# Patient Record
Sex: FEMALE | Race: WHITE | URBAN - METROPOLITAN AREA
[De-identification: names, ages, dates, MRNs, and addresses within clinical notes are randomized per-mention and may not be internally consistent; named-entity substitution may affect disease eponyms.]

---

## 2017-07-07 ENCOUNTER — EMERGENCY (EMERGENCY)
Facility: HOSPITAL | Age: 47
LOS: 0 days | Discharge: HOME | End: 2017-07-07
Attending: EMERGENCY MEDICINE | Admitting: EMERGENCY MEDICINE
Payer: SUBSIDIZED

## 2017-07-07 VITALS — HEIGHT: 64 IN | WEIGHT: 130.07 LBS

## 2017-07-07 VITALS
TEMPERATURE: 98 F | DIASTOLIC BLOOD PRESSURE: 97 MMHG | HEART RATE: 80 BPM | OXYGEN SATURATION: 100 % | RESPIRATION RATE: 18 BRPM | SYSTOLIC BLOOD PRESSURE: 143 MMHG

## 2017-07-07 DIAGNOSIS — F31.9 BIPOLAR DISORDER, UNSPECIFIED: ICD-10-CM

## 2017-07-07 DIAGNOSIS — Z98.890 OTHER SPECIFIED POSTPROCEDURAL STATES: ICD-10-CM

## 2017-07-07 DIAGNOSIS — G56.00 CARPAL TUNNEL SYNDROME, UNSPECIFIED UPPER LIMB: ICD-10-CM

## 2017-07-07 DIAGNOSIS — R56.9 UNSPECIFIED CONVULSIONS: ICD-10-CM

## 2017-07-07 DIAGNOSIS — Z90.711 ACQUIRED ABSENCE OF UTERUS WITH REMAINING CERVICAL STUMP: ICD-10-CM

## 2017-07-07 DIAGNOSIS — J40 BRONCHITIS, NOT SPECIFIED AS ACUTE OR CHRONIC: ICD-10-CM

## 2017-07-07 DIAGNOSIS — R07.9 CHEST PAIN, UNSPECIFIED: ICD-10-CM

## 2017-07-07 DIAGNOSIS — N80.0 ENDOMETRIOSIS OF UTERUS: ICD-10-CM

## 2017-07-07 DIAGNOSIS — D50.9 IRON DEFICIENCY ANEMIA, UNSPECIFIED: ICD-10-CM

## 2017-07-07 PROCEDURE — 71020: CPT | Mod: 26

## 2017-07-07 PROCEDURE — 93010 ELECTROCARDIOGRAM REPORT: CPT

## 2017-07-07 PROCEDURE — 99284 EMERGENCY DEPT VISIT MOD MDM: CPT

## 2017-07-07 RX ORDER — ALBUTEROL 90 UG/1
2 AEROSOL, METERED ORAL
Qty: 1 | Refills: 0 | OUTPATIENT
Start: 2017-07-07 | End: 2017-08-06

## 2017-07-07 RX ORDER — AZITHROMYCIN 500 MG/1
1 TABLET, FILM COATED ORAL
Qty: 6 | Refills: 0 | OUTPATIENT
Start: 2017-07-07 | End: 2017-07-13

## 2017-07-07 RX ORDER — ALBUTEROL 90 UG/1
2.5 AEROSOL, METERED ORAL
Qty: 0 | Refills: 0 | Status: COMPLETED | OUTPATIENT
Start: 2017-07-07 | End: 2017-07-07

## 2017-07-07 RX ORDER — AZITHROMYCIN 500 MG/1
250 TABLET, FILM COATED ORAL
Qty: 1250 | Refills: 0 | OUTPATIENT
Start: 2017-07-07 | End: 2017-07-12

## 2017-07-07 RX ADMIN — ALBUTEROL 2.5 MILLIGRAM(S): 90 AEROSOL, METERED ORAL at 15:36

## 2017-07-07 RX ADMIN — ALBUTEROL 2.5 MILLIGRAM(S): 90 AEROSOL, METERED ORAL at 15:00

## 2017-07-07 RX ADMIN — ALBUTEROL 2.5 MILLIGRAM(S): 90 AEROSOL, METERED ORAL at 14:40

## 2017-07-07 NOTE — ED STATDOCS - PMH
Carpal Tunnel Syndrome  left  Iron Deficiency Anemia    Manic Depression    Seizure 1 episode  1996    Uterine Endometriosis

## 2017-07-07 NOTE — ED STATDOCS - OBJECTIVE STATEMENT
46 y/o F presents to the ED c/o intermittent stabbing chest pain when laying down, HA, and cough with brown sputum. Pt has had worsening sx for 3 weeks with cough x 1 week. She quit smoking in April.

## 2017-07-07 NOTE — ED STATDOCS - PROGRESS NOTE DETAILS
pt with1 month  hx of productive cough and pleuritic CP. No associated fevers, sick contact or recent tracel  vss, afebrile  gen:NAD, CVS: S1,S2, RRR, Pulm: b/l rhonic in all lung fields  47 with probable URI- f/u cxr, albuterol nebs x3, reassess

## 2017-07-07 NOTE — ED STATDOCS - MEDICAL DECISION MAKING DETAILS
pt with three weeks of sinus congestion as well as cough that has become productive.  Concern for a sinus infection that is now PNA.  Will get CxR.  based on duration of symptoms will treat with ABx. Nebs for symptomatic relief.

## 2017-07-07 NOTE — ED ADULT NURSE NOTE - OBJECTIVE STATEMENT
[facility] pt is awake alert oriented following commands and speaking coherently. pt presents to er complaining chest pain when lying down, brown sputum, and cough x 1 week. pt states she quit smoking in april. rhonchi auscultated to b/l lungs, diffuse. resp even and unlabored. no respiratory distress. denies nausea vomiting fever chills headache and urinary problems.

## 2017-07-19 ENCOUNTER — EMERGENCY (EMERGENCY)
Facility: HOSPITAL | Age: 47
LOS: 0 days | Discharge: HOME | End: 2017-07-19
Attending: EMERGENCY MEDICINE | Admitting: EMERGENCY MEDICINE
Payer: SELF-PAY

## 2017-07-19 VITALS — DIASTOLIC BLOOD PRESSURE: 103 MMHG | SYSTOLIC BLOOD PRESSURE: 140 MMHG

## 2017-07-19 VITALS — WEIGHT: 130.07 LBS

## 2017-07-19 DIAGNOSIS — Z86.69 PERSONAL HISTORY OF OTHER DISEASES OF THE NERVOUS SYSTEM AND SENSE ORGANS: ICD-10-CM

## 2017-07-19 DIAGNOSIS — N80.0 ENDOMETRIOSIS OF UTERUS: ICD-10-CM

## 2017-07-19 DIAGNOSIS — R05 COUGH: ICD-10-CM

## 2017-07-19 DIAGNOSIS — F31.9 BIPOLAR DISORDER, UNSPECIFIED: ICD-10-CM

## 2017-07-19 DIAGNOSIS — B34.9 VIRAL INFECTION, UNSPECIFIED: ICD-10-CM

## 2017-07-19 DIAGNOSIS — D50.9 IRON DEFICIENCY ANEMIA, UNSPECIFIED: ICD-10-CM

## 2017-07-19 DIAGNOSIS — Z90.711 ACQUIRED ABSENCE OF UTERUS WITH REMAINING CERVICAL STUMP: ICD-10-CM

## 2017-07-19 PROCEDURE — 71020: CPT | Mod: 26

## 2017-07-19 PROCEDURE — 99284 EMERGENCY DEPT VISIT MOD MDM: CPT

## 2017-07-19 RX ORDER — IPRATROPIUM/ALBUTEROL SULFATE 18-103MCG
3 AEROSOL WITH ADAPTER (GRAM) INHALATION ONCE
Qty: 0 | Refills: 0 | Status: COMPLETED | OUTPATIENT
Start: 2017-07-19 | End: 2017-07-19

## 2017-07-19 RX ADMIN — Medication 50 MILLIGRAM(S): at 16:20

## 2017-07-19 RX ADMIN — Medication 3 MILLILITER(S): at 16:21

## 2017-07-19 RX ADMIN — Medication 3 MILLILITER(S): at 15:55

## 2017-07-19 NOTE — ED STATDOCS - OBJECTIVE STATEMENT
46 y/o F with hx of depression presents to the ED c/o cough with yellow sputum and nasal congestion x 5 weeks. Pt states she was seen in ED on 7/7, dx with bronchitis, given nebs, z-pack, and inhaler with improvement, however sx returned. Currently pt has no other complaints and denies CP, HA, fever and chills.

## 2017-07-19 NOTE — ED STATDOCS - PROGRESS NOTE DETAILS
48 yo female with a PMH of depression and past smoker, quit in april presents with cough, intermittent prod cough of yellow/clear sputum presents with constant cough. States was given z apck and albuterol without relief. Denies sick contacts, f/c/sweating, n/v. Duoneb, prednisone, CXR. Likely viral. -Ernesto Adkins PA-C Reevaluated patient at bedside.  Patient feeling much improved.  Discussed the results of all diagnostic testing in ED and copies of all reports given.   An opportunity to ask questions was given.  Discussed the importance of prompt, close medical follow-up.  Patient will return with any changes, concerns or persistent / worsening symptoms.  Understanding of all instructions verbalized.  -Ernesto Adkins PA-C

## 2017-07-19 NOTE — ED ADULT TRIAGE NOTE - CHIEF COMPLAINT QUOTE
Pt presents to ED c/o headache, dizziness and cough. Pt reports she was recently treated for pna with abx but is feeling like it is coming back

## 2017-07-19 NOTE — ED ADULT NURSE REASSESSMENT NOTE - NS ED NURSE REASSESS COMMENT FT1
pt rec'd alert and oriented x3. vss. c/o cough and congestion ,. taken to xray. medicated as ordered. will continue to monitor

## 2017-07-19 NOTE — ED STATDOCS - ENMT, MLM
Postnasal drip.  Mouth with normal mucosa  Throat has no vesicles, no oropharyngeal exudates and uvula is midline.

## 2017-07-19 NOTE — ED STATDOCS - ATTENDING CONTRIBUTION TO CARE
I, Jeremias Glass, performed the initial face to face bedside interview with this patient regarding history of present illness, review of symptoms and relevant past medical, social and family history.  I completed an independent physical examination.  I was the initial provider who evaluated this patient. I have signed out the follow up of any pending tests (i.e. labs, radiological studies) to the ACP.  I have communicated the patient’s plan of care and disposition with the ACP.  The history, relevant review of systems, past medical and surgical history, medical decision making, and physical examination was documented by the scribe in my presence and I attest to the accuracy of the documentation.

## 2018-10-07 ENCOUNTER — INPATIENT (INPATIENT)
Facility: HOSPITAL | Age: 48
LOS: 5 days | Discharge: HOME | End: 2018-10-13
Attending: INTERNAL MEDICINE | Admitting: INTERNAL MEDICINE
Payer: SELF-PAY

## 2018-10-07 PROCEDURE — 99285 EMERGENCY DEPT VISIT HI MDM: CPT

## 2018-10-07 PROCEDURE — 74177 CT ABD & PELVIS W/CONTRAST: CPT | Mod: 26

## 2018-10-08 VITALS
SYSTOLIC BLOOD PRESSURE: 133 MMHG | TEMPERATURE: 99 F | DIASTOLIC BLOOD PRESSURE: 78 MMHG | HEART RATE: 66 BPM | RESPIRATION RATE: 18 BRPM | OXYGEN SATURATION: 97 %

## 2018-10-08 DIAGNOSIS — K57.92 DIVERTICULITIS OF INTESTINE, PART UNSPECIFIED, WITHOUT PERFORATION OR ABSCESS WITHOUT BLEEDING: ICD-10-CM

## 2018-10-08 RX ORDER — HYDROMORPHONE HYDROCHLORIDE 2 MG/ML
1 INJECTION INTRAMUSCULAR; INTRAVENOUS; SUBCUTANEOUS EVERY 4 HOURS
Qty: 0 | Refills: 0 | Status: DISCONTINUED | OUTPATIENT
Start: 2018-10-08 | End: 2018-10-13

## 2018-10-08 RX ORDER — METRONIDAZOLE 500 MG
500 TABLET ORAL EVERY 8 HOURS
Qty: 0 | Refills: 0 | Status: DISCONTINUED | OUTPATIENT
Start: 2018-10-08 | End: 2018-10-13

## 2018-10-08 RX ORDER — NICOTINE POLACRILEX 2 MG
1 GUM BUCCAL DAILY
Qty: 0 | Refills: 0 | Status: DISCONTINUED | OUTPATIENT
Start: 2018-10-08 | End: 2018-10-13

## 2018-10-08 RX ORDER — KETOROLAC TROMETHAMINE 30 MG/ML
30 SYRINGE (ML) INJECTION EVERY 6 HOURS
Qty: 0 | Refills: 0 | Status: DISCONTINUED | OUTPATIENT
Start: 2018-10-08 | End: 2018-10-12

## 2018-10-08 RX ORDER — ENOXAPARIN SODIUM 100 MG/ML
40 INJECTION SUBCUTANEOUS EVERY 24 HOURS
Qty: 0 | Refills: 0 | Status: DISCONTINUED | OUTPATIENT
Start: 2018-10-08 | End: 2018-10-13

## 2018-10-08 RX ORDER — DOCUSATE SODIUM 100 MG
100 CAPSULE ORAL
Qty: 0 | Refills: 0 | Status: DISCONTINUED | OUTPATIENT
Start: 2018-10-08 | End: 2018-10-10

## 2018-10-08 RX ORDER — DIPHENHYDRAMINE HCL 50 MG
25 CAPSULE ORAL ONCE
Qty: 0 | Refills: 0 | Status: COMPLETED | OUTPATIENT
Start: 2018-10-08 | End: 2018-10-08

## 2018-10-08 RX ORDER — CIPROFLOXACIN LACTATE 400MG/40ML
400 VIAL (ML) INTRAVENOUS EVERY 12 HOURS
Qty: 0 | Refills: 0 | Status: DISCONTINUED | OUTPATIENT
Start: 2018-10-08 | End: 2018-10-12

## 2018-10-08 RX ORDER — PANTOPRAZOLE SODIUM 20 MG/1
40 TABLET, DELAYED RELEASE ORAL DAILY
Qty: 0 | Refills: 0 | Status: DISCONTINUED | OUTPATIENT
Start: 2018-10-08 | End: 2018-10-09

## 2018-10-08 RX ORDER — CITALOPRAM 10 MG/1
40 TABLET, FILM COATED ORAL DAILY
Qty: 0 | Refills: 0 | Status: DISCONTINUED | OUTPATIENT
Start: 2018-10-08 | End: 2018-10-13

## 2018-10-08 RX ORDER — SODIUM CHLORIDE 9 MG/ML
1000 INJECTION, SOLUTION INTRAVENOUS
Qty: 0 | Refills: 0 | Status: DISCONTINUED | OUTPATIENT
Start: 2018-10-08 | End: 2018-10-12

## 2018-10-08 RX ORDER — SODIUM CHLORIDE 9 MG/ML
1000 INJECTION INTRAMUSCULAR; INTRAVENOUS; SUBCUTANEOUS
Qty: 0 | Refills: 0 | Status: DISCONTINUED | OUTPATIENT
Start: 2018-10-08 | End: 2018-10-08

## 2018-10-08 RX ORDER — ONDANSETRON 8 MG/1
4 TABLET, FILM COATED ORAL EVERY 8 HOURS
Qty: 0 | Refills: 0 | Status: DISCONTINUED | OUTPATIENT
Start: 2018-10-08 | End: 2018-10-13

## 2018-10-08 RX ADMIN — Medication 25 MILLIGRAM(S): at 21:22

## 2018-10-08 RX ADMIN — HYDROMORPHONE HYDROCHLORIDE 1 MILLIGRAM(S): 2 INJECTION INTRAMUSCULAR; INTRAVENOUS; SUBCUTANEOUS at 15:30

## 2018-10-08 RX ADMIN — Medication 100 MILLIGRAM(S): at 21:11

## 2018-10-08 RX ADMIN — Medication 200 MILLIGRAM(S): at 17:02

## 2018-10-08 RX ADMIN — Medication 100 MILLIGRAM(S): at 13:00

## 2018-10-08 RX ADMIN — HYDROMORPHONE HYDROCHLORIDE 1 MILLIGRAM(S): 2 INJECTION INTRAMUSCULAR; INTRAVENOUS; SUBCUTANEOUS at 21:10

## 2018-10-08 RX ADMIN — Medication 30 MILLIGRAM(S): at 22:11

## 2018-10-08 NOTE — CONSULT NOTE ADULT - SUBJECTIVE AND OBJECTIVE BOX
48F w/ hx of diverticulitis x1 appx 5 years ago treated with IV antibiotics p/w 1 week hx of diarrhea and worsening LLQ abd pain. Denies fever. C/o nausea and weakness. Patient does not recall having a colonoscopy at the time of her first episode.     PMH: Depression, diverticulitis, c. diff,   PSH: left arm lymph node     Fhx: breast ca (mother)  SHx: +smoker, 1/2 ppd    ROS: +headache, +hot flashes, +fatigue, +weakness  GI: no n/v, +diarrhea, +bloating, +abd pain  : denies    Tmax 97.6, HR 65, bp 134/83  NAD, NC  Abd soft, non distended, localized peritonitis                          14.5   14.67 )-----------( 289      ( 07 Oct 2018 16:00 )             42.2   10-07    139  |  104  |  11  ----------------------------<  95  4.1   |  27  |  0.67    Ca    9.1      07 Oct 2018 16:00    TPro  7.6  /  Alb  4.1  /  TBili  0.6  /  DBili  x   /  AST  36  /  ALT  39  /  AlkPhos  114  10-07

## 2018-10-09 LAB
ANION GAP SERPL CALC-SCNC: 9 MMOL/L — SIGNIFICANT CHANGE UP (ref 5–17)
BUN SERPL-MCNC: 7 MG/DL — SIGNIFICANT CHANGE UP (ref 7–23)
CALCIUM SERPL-MCNC: 8.4 MG/DL — LOW (ref 8.5–10.1)
CHLORIDE SERPL-SCNC: 105 MMOL/L — SIGNIFICANT CHANGE UP (ref 96–108)
CHOLEST SERPL-MCNC: 219 MG/DL — HIGH (ref 10–199)
CO2 SERPL-SCNC: 25 MMOL/L — SIGNIFICANT CHANGE UP (ref 22–31)
CREAT SERPL-MCNC: 0.52 MG/DL — SIGNIFICANT CHANGE UP (ref 0.5–1.3)
GLUCOSE SERPL-MCNC: 107 MG/DL — HIGH (ref 70–99)
HCT VFR BLD CALC: 36.2 % — SIGNIFICANT CHANGE UP (ref 34.5–45)
HDLC SERPL-MCNC: 68 MG/DL — SIGNIFICANT CHANGE UP
HGB BLD-MCNC: 12.1 G/DL — SIGNIFICANT CHANGE UP (ref 11.5–15.5)
LIPID PNL WITH DIRECT LDL SERPL: 130 MG/DL — HIGH
MAGNESIUM SERPL-MCNC: 1.6 MG/DL — SIGNIFICANT CHANGE UP (ref 1.6–2.6)
MCHC RBC-ENTMCNC: 33.4 GM/DL — SIGNIFICANT CHANGE UP (ref 32–36)
MCHC RBC-ENTMCNC: 33.7 PG — SIGNIFICANT CHANGE UP (ref 27–34)
MCV RBC AUTO: 100.8 FL — HIGH (ref 80–100)
NRBC # BLD: 0 /100 WBCS — SIGNIFICANT CHANGE UP (ref 0–0)
PHOSPHATE SERPL-MCNC: 3.6 MG/DL — SIGNIFICANT CHANGE UP (ref 2.5–4.5)
PLATELET # BLD AUTO: 194 K/UL — SIGNIFICANT CHANGE UP (ref 150–400)
POTASSIUM SERPL-MCNC: 3.7 MMOL/L — SIGNIFICANT CHANGE UP (ref 3.5–5.3)
POTASSIUM SERPL-SCNC: 3.7 MMOL/L — SIGNIFICANT CHANGE UP (ref 3.5–5.3)
RBC # BLD: 3.59 M/UL — LOW (ref 3.8–5.2)
RBC # FLD: 12.4 % — SIGNIFICANT CHANGE UP (ref 10.3–14.5)
SODIUM SERPL-SCNC: 139 MMOL/L — SIGNIFICANT CHANGE UP (ref 135–145)
TOTAL CHOLESTEROL/HDL RATIO MEASUREMENT: 3.2 RATIO — LOW (ref 3.3–7.1)
TRIGL SERPL-MCNC: 104 MG/DL — SIGNIFICANT CHANGE UP (ref 10–149)
WBC # BLD: 10.12 K/UL — SIGNIFICANT CHANGE UP (ref 3.8–10.5)
WBC # FLD AUTO: 10.12 K/UL — SIGNIFICANT CHANGE UP (ref 3.8–10.5)

## 2018-10-09 RX ORDER — LACTOBACILLUS ACIDOPHILUS 100MM CELL
1 CAPSULE ORAL DAILY
Qty: 0 | Refills: 0 | Status: DISCONTINUED | OUTPATIENT
Start: 2018-10-09 | End: 2018-10-13

## 2018-10-09 RX ORDER — DIPHENHYDRAMINE HCL 50 MG
25 CAPSULE ORAL EVERY 6 HOURS
Qty: 0 | Refills: 0 | Status: DISCONTINUED | OUTPATIENT
Start: 2018-10-09 | End: 2018-10-13

## 2018-10-09 RX ORDER — INFLUENZA VIRUS VACCINE 15; 15; 15; 15 UG/.5ML; UG/.5ML; UG/.5ML; UG/.5ML
0.5 SUSPENSION INTRAMUSCULAR ONCE
Qty: 0 | Refills: 0 | Status: DISCONTINUED | OUTPATIENT
Start: 2018-10-09 | End: 2018-10-13

## 2018-10-09 RX ORDER — ACETAMINOPHEN 500 MG
650 TABLET ORAL EVERY 6 HOURS
Qty: 0 | Refills: 0 | Status: DISCONTINUED | OUTPATIENT
Start: 2018-10-09 | End: 2018-10-13

## 2018-10-09 RX ORDER — PANTOPRAZOLE SODIUM 20 MG/1
40 TABLET, DELAYED RELEASE ORAL
Qty: 0 | Refills: 0 | Status: DISCONTINUED | OUTPATIENT
Start: 2018-10-09 | End: 2018-10-10

## 2018-10-09 RX ADMIN — Medication 100 MILLIGRAM(S): at 21:07

## 2018-10-09 RX ADMIN — Medication 30 MILLIGRAM(S): at 06:32

## 2018-10-09 RX ADMIN — CITALOPRAM 40 MILLIGRAM(S): 10 TABLET, FILM COATED ORAL at 11:27

## 2018-10-09 RX ADMIN — SODIUM CHLORIDE 110 MILLILITER(S): 9 INJECTION, SOLUTION INTRAVENOUS at 06:36

## 2018-10-09 RX ADMIN — HYDROMORPHONE HYDROCHLORIDE 1 MILLIGRAM(S): 2 INJECTION INTRAMUSCULAR; INTRAVENOUS; SUBCUTANEOUS at 11:03

## 2018-10-09 RX ADMIN — SODIUM CHLORIDE 110 MILLILITER(S): 9 INJECTION, SOLUTION INTRAVENOUS at 20:03

## 2018-10-09 RX ADMIN — Medication 100 MILLIGRAM(S): at 14:09

## 2018-10-09 RX ADMIN — Medication 30 MILLIGRAM(S): at 15:47

## 2018-10-09 RX ADMIN — Medication 200 MILLIGRAM(S): at 17:47

## 2018-10-09 RX ADMIN — HYDROMORPHONE HYDROCHLORIDE 1 MILLIGRAM(S): 2 INJECTION INTRAMUSCULAR; INTRAVENOUS; SUBCUTANEOUS at 10:51

## 2018-10-09 RX ADMIN — Medication 200 MILLIGRAM(S): at 06:28

## 2018-10-09 RX ADMIN — Medication 25 MILLIGRAM(S): at 10:51

## 2018-10-09 RX ADMIN — Medication 30 MILLIGRAM(S): at 16:00

## 2018-10-09 RX ADMIN — HYDROMORPHONE HYDROCHLORIDE 1 MILLIGRAM(S): 2 INJECTION INTRAMUSCULAR; INTRAVENOUS; SUBCUTANEOUS at 05:07

## 2018-10-09 RX ADMIN — HYDROMORPHONE HYDROCHLORIDE 1 MILLIGRAM(S): 2 INJECTION INTRAMUSCULAR; INTRAVENOUS; SUBCUTANEOUS at 21:35

## 2018-10-09 RX ADMIN — Medication 1 PATCH: at 11:27

## 2018-10-09 RX ADMIN — PANTOPRAZOLE SODIUM 40 MILLIGRAM(S): 20 TABLET, DELAYED RELEASE ORAL at 05:10

## 2018-10-09 RX ADMIN — HYDROMORPHONE HYDROCHLORIDE 1 MILLIGRAM(S): 2 INJECTION INTRAMUSCULAR; INTRAVENOUS; SUBCUTANEOUS at 23:23

## 2018-10-09 RX ADMIN — Medication 1 TABLET(S): at 11:27

## 2018-10-09 RX ADMIN — PANTOPRAZOLE SODIUM 40 MILLIGRAM(S): 20 TABLET, DELAYED RELEASE ORAL at 17:48

## 2018-10-09 RX ADMIN — Medication 100 MILLIGRAM(S): at 05:03

## 2018-10-09 RX ADMIN — ONDANSETRON 4 MILLIGRAM(S): 8 TABLET, FILM COATED ORAL at 00:30

## 2018-10-09 RX ADMIN — Medication 25 MILLIGRAM(S): at 21:35

## 2018-10-09 RX ADMIN — ENOXAPARIN SODIUM 40 MILLIGRAM(S): 100 INJECTION SUBCUTANEOUS at 11:28

## 2018-10-09 NOTE — PROGRESS NOTE ADULT - PROBLEM SELECTOR PLAN 1
Continue IV antibiotics  Advance to full liquids in AM  Consider D/C planning tomorrow if pain continues to resolve

## 2018-10-09 NOTE — PROGRESS NOTE ADULT - SUBJECTIVE AND OBJECTIVE BOX
Interval History:Feels better. Pain improving    MEDICATIONS  (STANDING):  ciprofloxacin   IVPB 400 milliGRAM(s) IV Intermittent every 12 hours  citalopram 40 milliGRAM(s) Oral daily  docusate sodium 100 milliGRAM(s) Oral two times a day  enoxaparin Injectable 40 milliGRAM(s) SubCutaneous every 24 hours  influenza   Vaccine 0.5 milliLiter(s) IntraMuscular once  lactated ringers. 1000 milliLiter(s) (110 mL/Hr) IV Continuous <Continuous>  lactobacillus acidophilus 1 Tablet(s) Oral daily  metroNIDAZOLE  IVPB 500 milliGRAM(s) IV Intermittent every 8 hours  nicotine -  14 mG/24Hr(s) Patch 1 patch Transdermal daily  pantoprazole  Injectable 40 milliGRAM(s) IV Push two times a day    MEDICATIONS  (PRN):  diphenhydrAMINE   Injectable 25 milliGRAM(s) IV Push every 6 hours PRN use prior to dilaudid for itching  HYDROmorphone  Injectable 1 milliGRAM(s) IV Push every 4 hours PRN Severe Pain (7 - 10)  ketorolac   Injectable 30 milliGRAM(s) IV Push every 6 hours PRN Moderate Pain (4 - 6)  ondansetron Injectable 4 milliGRAM(s) IV Push every 8 hours PRN Nausea      Daily     Daily   BMI:   Change in Weight:  Vital Signs Last 24 Hrs  T(C): 36.9 (09 Oct 2018 12:06), Max: 37.2 (08 Oct 2018 17:19)  T(F): 98.4 (09 Oct 2018 12:06), Max: 99 (08 Oct 2018 17:19)  HR: 60 (09 Oct 2018 12:06) (60 - 74)  BP: 141/86 (09 Oct 2018 12:06) (132/85 - 141/86)  BP(mean): --  RR: 17 (09 Oct 2018 12:06) (16 - 18)  SpO2: 100% (09 Oct 2018 12:06) (96% - 100%)  I&O's Detail    08 Oct 2018 07:01  -  09 Oct 2018 07:00  --------------------------------------------------------  IN:    lactated ringers.: 1000 mL  Total IN: 1000 mL    OUT:  Total OUT: 0 mL    Total NET: 1000 mL          PHYSICAL EXAM  General:  Well developed, well nourished, alert and active, no pallor, NAD.  HEENT:    Normal appearance of conjunctiva, ears, nose, lips, oropharynx, and oral mucosa, anicteric.  Neck:  No masses, no asymmetry.  Lymph Nodes:  No lymphadenopathy.   Cardiovascular:  RRR normal S1/S2, no murmur.  Respiratory:  CTA B/L, normal respiratory effort.   Abdominal:   soft, no masses Mild LLQ tenderness, normoactive BS, NT/ND, no HSM.  Extremities:   No clubbing or cyanosis, normal capillary refill, no edema.   Skin:   No rash, jaundice, lesions, eczema.   Musculoskeletal:  No joint swelling, erythema or tenderness.   Other:     Lab Results:                        12.1   10.12 )-----------( 194      ( 09 Oct 2018 07:18 )             36.2     10-09    139  |  105  |  7   ----------------------------<  107<H>  3.7   |  25  |  0.52    Ca    8.4<L>      09 Oct 2018 07:18  Phos  3.6     10-09  Mg     1.6     10-09          Triglycerides, Serum: 104 mg/dL (10-09 @ 07:18)      Stool Results:          RADIOLOGY RESULTS:    SURGICAL PATHOLOGY:

## 2018-10-09 NOTE — PROGRESS NOTE ADULT - SUBJECTIVE AND OBJECTIVE BOX
48F w/ hx of diverticulitis x1 appx 5 years ago treated with IV antibiotics p/w 1 week hx of diarrhea and worsening LLQ abd pain. Denies fever. C/o nausea and weakness. Patient does not recall having a colonoscopy at the time of her first episode.     Feeling improved today. C/o LLQ pain. +flatus. Denies n/v.    Vital Signs Last 24 Hrs  T(C): 36.7 (09 Oct 2018 05:24), Max: 37.2 (08 Oct 2018 17:19)  T(F): 98.1 (09 Oct 2018 05:24), Max: 99 (08 Oct 2018 17:19)  HR: 74 (09 Oct 2018 05:24) (63 - 74)  BP: 137/80 (09 Oct 2018 05:24) (132/85 - 137/80)  RR: 16 (09 Oct 2018 05:24) (16 - 18)  SpO2: 98% (09 Oct 2018 05:24) (96% - 98%)    Abd soft, tender in the LLQ                          12.1   10.12 )-----------( 194      ( 09 Oct 2018 07:18 )             36.2   10-09    139  |  105  |  7   ----------------------------<  107<H>  3.7   |  25  |  0.52    Ca    8.4<L>      09 Oct 2018 07:18  Phos  3.6     10-09  Mg     1.6     10-09    TPro  7.6  /  Alb  4.1  /  TBili  0.6  /  DBili  x   /  AST  36  /  ALT  39  /  AlkPhos  114  10-07

## 2018-10-09 NOTE — PROGRESS NOTE ADULT - ASSESSMENT
47 yo F with pmh stated above p/w    A:  Acute non perforated sigmoid diverticulitis      P:  CT findings above  Monitor CBC counts  Continue bowel rest as patient still not able to tolerate any by mouth  Anti-emetics, pain control, IVF, IV abx  Eventual colonscopy outpt  DVT px    total time: 40 min  D/W nursing

## 2018-10-09 NOTE — PROGRESS NOTE ADULT - SUBJECTIVE AND OBJECTIVE BOX
Patient is a 48y old  Female who presents with a chief complaint of diverticulitis       SUBJECTIVE: abd pain  HPI:  47 yo F with pmh depression , cdiff, diverticulitis now p/w diffuse abd pain CT d/w sigmoid diverticulitis      10/9: patient abd less distended. No N/V. passing flatus. Had pain with ice chips per nursing. Afebrile. WBC trending down        REVIEW OF SYSTEMS:    CONSTITUTIONAL: No weakness, fevers or chills  EYES/ENT: No visual changes;  No vertigo or throat pain   NECK: No pain or stiffness  RESPIRATORY: No cough, wheezing, hemoptysis; No shortness of breath  CARDIOVASCULAR: No chest pain or palpitations  GASTROINTESTINAL: No abdominal or epigastric pain. No nausea, vomiting, or hematemesis; No diarrhea or constipation. No melena or hematochezia.  GENITOURINARY: No dysuria, frequency or hematuria  NEUROLOGICAL: No numbness or weakness  SKIN: No itching, burning, rashes, or lesions   All other review of systems is negative unless indicated above        Vital Signs Last 24 Hrs  T(C): 36.7 (09 Oct 2018 05:24), Max: 37.2 (08 Oct 2018 17:19)  ICU Vital Signs Last 24 Hrs  T(C): 36.7 (09 Oct 2018 05:24), Max: 37.2 (08 Oct 2018 17:19)  T(F): 98.1 (09 Oct 2018 05:24), Max: 99 (08 Oct 2018 17:19)  HR: 74 (09 Oct 2018 05:24) (63 - 74)  BP: 137/80 (09 Oct 2018 05:24) (132/85 - 137/80)  BP(mean): --  ABP: --  ABP(mean): --  RR: 16 (09 Oct 2018 05:24) (16 - 18)  SpO2: 98% (09 Oct 2018 05:24) (96% - 98%)          CAPILLARY BLOOD GLUCOSE          PHYSICAL EXAM:    Constitutional: NAD, awake and alert, well-developed  HEENT: PERR, EOMI, Normal Hearing, MMM  Neck: Soft and supple, No LAD, No JVD  Respiratory: Breath sounds are clear bilaterally, No wheezing, rales or rhonchi  Cardiovascular: S1 and S2, regular rate and rhythm, no Murmurs, gallops or rubs  Gastrointestinal: + BS in all 4 quadrants, nt/nd  Vascular: 2+ peripheral pulses  Neurological: A/O x 3, no focal deficits  Musculoskeletal: 5/5 strength b/l upper and lower extremities  Skin: No rashes    MEDICATIONS:  MEDICATIONS  (STANDING):  ciprofloxacin   IVPB 400 milliGRAM(s) IV Intermittent every 12 hours  citalopram 40 milliGRAM(s) Oral daily  docusate sodium 100 milliGRAM(s) Oral two times a day  enoxaparin Injectable 40 milliGRAM(s) SubCutaneous every 24 hours  lactated ringers. 1000 milliLiter(s) (110 mL/Hr) IV Continuous <Continuous>  lactobacillus acidophilus 1 Tablet(s) Oral daily  metroNIDAZOLE  IVPB 500 milliGRAM(s) IV Intermittent every 8 hours  nicotine -  14 mG/24Hr(s) Patch 1 patch Transdermal daily  pantoprazole  Injectable 40 milliGRAM(s) IV Push two times a day      LABS: All Labs Reviewed:                        12.1   10.12 )-----------( 194      ( 09 Oct 2018 07:18 )             36.2     10-09    139  |  105  |  7   ----------------------------<  107<H>  3.7   |  25  |  0.52    Ca    8.4<L>      09 Oct 2018 07:18  Phos  3.6     10-09  Mg     1.6     10-09    TPro  7.6  /  Alb  4.1  /  TBili  0.6  /  DBili  x   /  AST  36  /  ALT  39  /  AlkPhos  114  10-07    PT/INR - ( 07 Oct 2018 16:00 )   PT: 10.3 sec;   INR: 0.95 ratio         PTT - ( 07 Oct 2018 16:00 )  PTT:27.5 sec          Blood Culture:     RADIOLOGY/EKG:    < from: CT Abdomen and Pelvis w/ Oral Cont and w/ IV Cont (10.07.18 @ 20:17) >    IMPRESSION: Acute diverticulitis at the junction of descending and   sigmoid colon.        < end of copied text >

## 2018-10-10 LAB
HCT VFR BLD CALC: 31.9 % — LOW (ref 34.5–45)
HGB BLD-MCNC: 10.8 G/DL — LOW (ref 11.5–15.5)
MCHC RBC-ENTMCNC: 33.9 GM/DL — SIGNIFICANT CHANGE UP (ref 32–36)
MCHC RBC-ENTMCNC: 34.1 PG — HIGH (ref 27–34)
MCV RBC AUTO: 100.6 FL — HIGH (ref 80–100)
NRBC # BLD: 0 /100 WBCS — SIGNIFICANT CHANGE UP (ref 0–0)
PLATELET # BLD AUTO: 189 K/UL — SIGNIFICANT CHANGE UP (ref 150–400)
RBC # BLD: 3.17 M/UL — LOW (ref 3.8–5.2)
RBC # FLD: 12.2 % — SIGNIFICANT CHANGE UP (ref 10.3–14.5)
WBC # BLD: 7.22 K/UL — SIGNIFICANT CHANGE UP (ref 3.8–10.5)
WBC # FLD AUTO: 7.22 K/UL — SIGNIFICANT CHANGE UP (ref 3.8–10.5)

## 2018-10-10 RX ORDER — PANTOPRAZOLE SODIUM 20 MG/1
40 TABLET, DELAYED RELEASE ORAL EVERY 12 HOURS
Qty: 0 | Refills: 0 | Status: DISCONTINUED | OUTPATIENT
Start: 2018-10-10 | End: 2018-10-13

## 2018-10-10 RX ADMIN — Medication 200 MILLIGRAM(S): at 05:53

## 2018-10-10 RX ADMIN — Medication 30 MILLIGRAM(S): at 16:10

## 2018-10-10 RX ADMIN — ENOXAPARIN SODIUM 40 MILLIGRAM(S): 100 INJECTION SUBCUTANEOUS at 15:06

## 2018-10-10 RX ADMIN — Medication 30 MILLIGRAM(S): at 09:30

## 2018-10-10 RX ADMIN — Medication 100 MILLIGRAM(S): at 15:06

## 2018-10-10 RX ADMIN — PANTOPRAZOLE SODIUM 40 MILLIGRAM(S): 20 TABLET, DELAYED RELEASE ORAL at 04:42

## 2018-10-10 RX ADMIN — Medication 100 MILLIGRAM(S): at 04:41

## 2018-10-10 RX ADMIN — Medication 1 PATCH: at 12:25

## 2018-10-10 RX ADMIN — Medication 100 MILLIGRAM(S): at 04:42

## 2018-10-10 RX ADMIN — Medication 30 MILLIGRAM(S): at 09:16

## 2018-10-10 RX ADMIN — Medication 100 MILLIGRAM(S): at 21:11

## 2018-10-10 RX ADMIN — CITALOPRAM 40 MILLIGRAM(S): 10 TABLET, FILM COATED ORAL at 12:25

## 2018-10-10 RX ADMIN — Medication 30 MILLIGRAM(S): at 15:57

## 2018-10-10 RX ADMIN — SODIUM CHLORIDE 110 MILLILITER(S): 9 INJECTION, SOLUTION INTRAVENOUS at 09:26

## 2018-10-10 RX ADMIN — PANTOPRAZOLE SODIUM 40 MILLIGRAM(S): 20 TABLET, DELAYED RELEASE ORAL at 17:36

## 2018-10-10 RX ADMIN — Medication 30 MILLIGRAM(S): at 21:20

## 2018-10-10 RX ADMIN — SODIUM CHLORIDE 110 MILLILITER(S): 9 INJECTION, SOLUTION INTRAVENOUS at 22:41

## 2018-10-10 RX ADMIN — Medication 1 TABLET(S): at 12:25

## 2018-10-10 RX ADMIN — Medication 200 MILLIGRAM(S): at 17:36

## 2018-10-10 NOTE — PROGRESS NOTE ADULT - ASSESSMENT
Problem: Mobility  Goal: Risk for activity intolerance will decrease    Intervention: Encourage patient to increase activity level in collaboration with Interdisciplinary Team  Goal met.       Problem: Psychosocial Needs:  Goal: Level of anxiety will decrease    Intervention: Identify and develop with patient strategies to cope with anxiety triggers  Goal not met.         48F w/ acute diverticulitis    -clears  -IV antibiotics  -serial abd exams  -oob

## 2018-10-10 NOTE — PROGRESS NOTE ADULT - SUBJECTIVE AND OBJECTIVE BOX
Patient is a 48y old  Female who presents with a chief complaint of diverticulitis       SUBJECTIVE: abd pain  HPI:  49 yo F with pmh depression , cdiff, diverticulitis now p/w diffuse abd pain CT d/w sigmoid diverticulitis      10/9: patient abd less distended. No N/V. passing flatus. Had pain with ice chips per nursing. Afebrile. WBC trending down  10/10: had sever pain after clears. endorses 3 epsiodes of small quantity diarrhea in last 24 hours -        REVIEW OF SYSTEMS:    CONSTITUTIONAL: No weakness, fevers or chills  EYES/ENT: No visual changes;  No vertigo or throat pain   NECK: No pain or stiffness  RESPIRATORY: No cough, wheezing, hemoptysis; No shortness of breath  CARDIOVASCULAR: No chest pain or palpitations  GASTROINTESTINAL: No abdominal or epigastric pain. No nausea, vomiting, or hematemesis; No diarrhea or constipation. No melena or hematochezia.  GENITOURINARY: No dysuria, frequency or hematuria  NEUROLOGICAL: No numbness or weakness  SKIN: No itching, burning, rashes, or lesions   All other review of systems is negative unless indicated above        ICU Vital Signs Last 24 Hrs  T(C): 36.9 (10 Oct 2018 13:39), Max: 36.9 (10 Oct 2018 13:39)  T(F): 98.5 (10 Oct 2018 13:39), Max: 98.5 (10 Oct 2018 13:39)  HR: 55 (10 Oct 2018 13:39) (55 - 73)  BP: 156/88 (10 Oct 2018 13:39) (122/68 - 156/88)  BP(mean): --  ABP: --  ABP(mean): --  RR: 16 (10 Oct 2018 13:39) (16 - 18)  SpO2: 97% (10 Oct 2018 13:39) (96% - 100%)          CAPILLARY BLOOD GLUCOSE          PHYSICAL EXAM:    Constitutional: NAD, awake and alert, well-developed  HEENT: PERR, EOMI, Normal Hearing, MMM  Neck: Soft and supple, No LAD, No JVD  Respiratory: Breath sounds are clear bilaterally, No wheezing, rales or rhonchi  Cardiovascular: S1 and S2, regular rate and rhythm, no Murmurs, gallops or rubs  Gastrointestinal: + BS in all 4 quadrants, nt/nd  Vascular: 2+ peripheral pulses  Neurological: A/O x 3, no focal deficits  Musculoskeletal: 5/5 strength b/l upper and lower extremities  Skin: No rashes    MEDICATIONS:  MEDICATIONS  (STANDING):  ciprofloxacin   IVPB 400 milliGRAM(s) IV Intermittent every 12 hours  citalopram 40 milliGRAM(s) Oral daily  docusate sodium 100 milliGRAM(s) Oral two times a day  enoxaparin Injectable 40 milliGRAM(s) SubCutaneous every 24 hours  lactated ringers. 1000 milliLiter(s) (110 mL/Hr) IV Continuous <Continuous>  lactobacillus acidophilus 1 Tablet(s) Oral daily  metroNIDAZOLE  IVPB 500 milliGRAM(s) IV Intermittent every 8 hours  nicotine -  14 mG/24Hr(s) Patch 1 patch Transdermal daily  pantoprazole  Injectable 40 milliGRAM(s) IV Push two times a day      LABS: All Labs Reviewed:                        12.1   10.12 )-----------( 194      ( 09 Oct 2018 07:18 )             36.2     10-09    139  |  105  |  7   ----------------------------<  107<H>  3.7   |  25  |  0.52    Ca    8.4<L>      09 Oct 2018 07:18  Phos  3.6     10-09  Mg     1.6     10-09    TPro  7.6  /  Alb  4.1  /  TBili  0.6  /  DBili  x   /  AST  36  /  ALT  39  /  AlkPhos  114  10-07    PT/INR - ( 07 Oct 2018 16:00 )   PT: 10.3 sec;   INR: 0.95 ratio         PTT - ( 07 Oct 2018 16:00 )  PTT:27.5 sec          Blood Culture:     RADIOLOGY/EKG:    < from: CT Abdomen and Pelvis w/ Oral Cont and w/ IV Cont (10.07.18 @ 20:17) >    IMPRESSION: Acute diverticulitis at the junction of descending and   sigmoid colon.        < end of copied text >

## 2018-10-10 NOTE — PROGRESS NOTE ADULT - ASSESSMENT
49 yo F with pmh stated above p/w    A:  Acute non perforated sigmoid diverticulitis      P:  CT findings above  Monitor CBC counts  Continue bowel rest as patient still not able to tolerate any by mouth  Anti-emetics, pain control, IVF, IV abx  Add bacid, cont PPI. Stop colace. IF patient has recurrent diarrhea, obtain stool for c diff given her h/o c diff.   Eventual colonscopy outpt  DVT px    total time: 45 min  D/W nursing

## 2018-10-10 NOTE — PROGRESS NOTE ADULT - SUBJECTIVE AND OBJECTIVE BOX
48F w/ hx of diverticulitis x1 appx 5 years ago treated with IV antibiotics p/w 1 week hx of diarrhea and worsening LLQ abd pain. Denies fever. C/o nausea and weakness. Patient does not recall having a colonoscopy at the time of her first episode.     Feeling improved today. C/o LLQ pain. +flatus/bm. Denies n/v.    Vital Signs Last 24 Hrs  T(C): 36.7 (10 Oct 2018 04:29), Max: 36.9 (09 Oct 2018 12:06)  T(F): 98 (10 Oct 2018 04:29), Max: 98.4 (09 Oct 2018 12:06)  HR: 58 (10 Oct 2018 04:29) (58 - 73)  BP: 141/79 (10 Oct 2018 04:29) (122/68 - 141/86)  RR: 17 (10 Oct 2018 04:29) (17 - 18)  SpO2: 96% (10 Oct 2018 04:29) (96% - 100%)    abd soft, tender in the LLQ                          10.8   7.22  )-----------( 189      ( 10 Oct 2018 08:33 )             31.9   10-09    139  |  105  |  7   ----------------------------<  107<H>  3.7   |  25  |  0.52    Ca    8.4<L>      09 Oct 2018 07:18  Phos  3.6     10-09  Mg     1.6     10-09

## 2018-10-11 LAB
ANION GAP SERPL CALC-SCNC: 7 MMOL/L — SIGNIFICANT CHANGE UP (ref 5–17)
BUN SERPL-MCNC: 4 MG/DL — LOW (ref 7–23)
C DIFF BY PCR RESULT: SIGNIFICANT CHANGE UP
C DIFF TOX GENS STL QL NAA+PROBE: SIGNIFICANT CHANGE UP
CALCIUM SERPL-MCNC: 8.7 MG/DL — SIGNIFICANT CHANGE UP (ref 8.5–10.1)
CHLORIDE SERPL-SCNC: 105 MMOL/L — SIGNIFICANT CHANGE UP (ref 96–108)
CO2 SERPL-SCNC: 30 MMOL/L — SIGNIFICANT CHANGE UP (ref 22–31)
CREAT SERPL-MCNC: 0.66 MG/DL — SIGNIFICANT CHANGE UP (ref 0.5–1.3)
GLUCOSE SERPL-MCNC: 105 MG/DL — HIGH (ref 70–99)
HCT VFR BLD CALC: 34 % — LOW (ref 34.5–45)
HGB BLD-MCNC: 11.7 G/DL — SIGNIFICANT CHANGE UP (ref 11.5–15.5)
MCHC RBC-ENTMCNC: 34 PG — SIGNIFICANT CHANGE UP (ref 27–34)
MCHC RBC-ENTMCNC: 34.4 GM/DL — SIGNIFICANT CHANGE UP (ref 32–36)
MCV RBC AUTO: 98.8 FL — SIGNIFICANT CHANGE UP (ref 80–100)
NRBC # BLD: 0 /100 WBCS — SIGNIFICANT CHANGE UP (ref 0–0)
PLATELET # BLD AUTO: 210 K/UL — SIGNIFICANT CHANGE UP (ref 150–400)
POTASSIUM SERPL-MCNC: 3.4 MMOL/L — LOW (ref 3.5–5.3)
POTASSIUM SERPL-SCNC: 3.4 MMOL/L — LOW (ref 3.5–5.3)
RBC # BLD: 3.44 M/UL — LOW (ref 3.8–5.2)
RBC # FLD: 12 % — SIGNIFICANT CHANGE UP (ref 10.3–14.5)
SODIUM SERPL-SCNC: 142 MMOL/L — SIGNIFICANT CHANGE UP (ref 135–145)
WBC # BLD: 6.99 K/UL — SIGNIFICANT CHANGE UP (ref 3.8–10.5)
WBC # FLD AUTO: 6.99 K/UL — SIGNIFICANT CHANGE UP (ref 3.8–10.5)

## 2018-10-11 RX ORDER — POTASSIUM CHLORIDE 20 MEQ
40 PACKET (EA) ORAL ONCE
Qty: 0 | Refills: 0 | Status: COMPLETED | OUTPATIENT
Start: 2018-10-11 | End: 2018-10-11

## 2018-10-11 RX ADMIN — Medication 1 TABLET(S): at 12:44

## 2018-10-11 RX ADMIN — PANTOPRAZOLE SODIUM 40 MILLIGRAM(S): 20 TABLET, DELAYED RELEASE ORAL at 17:31

## 2018-10-11 RX ADMIN — CITALOPRAM 40 MILLIGRAM(S): 10 TABLET, FILM COATED ORAL at 12:44

## 2018-10-11 RX ADMIN — Medication 30 MILLIGRAM(S): at 09:15

## 2018-10-11 RX ADMIN — Medication 30 MILLIGRAM(S): at 21:28

## 2018-10-11 RX ADMIN — PANTOPRAZOLE SODIUM 40 MILLIGRAM(S): 20 TABLET, DELAYED RELEASE ORAL at 05:23

## 2018-10-11 RX ADMIN — ENOXAPARIN SODIUM 40 MILLIGRAM(S): 100 INJECTION SUBCUTANEOUS at 12:44

## 2018-10-11 RX ADMIN — Medication 40 MILLIEQUIVALENT(S): at 12:44

## 2018-10-11 RX ADMIN — SODIUM CHLORIDE 110 MILLILITER(S): 9 INJECTION, SOLUTION INTRAVENOUS at 07:41

## 2018-10-11 RX ADMIN — Medication 30 MILLIGRAM(S): at 08:36

## 2018-10-11 RX ADMIN — Medication 1 PATCH: at 12:44

## 2018-10-11 RX ADMIN — Medication 100 MILLIGRAM(S): at 21:29

## 2018-10-11 RX ADMIN — Medication 30 MILLIGRAM(S): at 14:55

## 2018-10-11 RX ADMIN — Medication 200 MILLIGRAM(S): at 17:32

## 2018-10-11 RX ADMIN — Medication 100 MILLIGRAM(S): at 14:52

## 2018-10-11 RX ADMIN — Medication 100 MILLIGRAM(S): at 05:24

## 2018-10-11 RX ADMIN — SODIUM CHLORIDE 110 MILLILITER(S): 9 INJECTION, SOLUTION INTRAVENOUS at 17:37

## 2018-10-11 RX ADMIN — Medication 200 MILLIGRAM(S): at 07:40

## 2018-10-11 NOTE — PROGRESS NOTE ADULT - ASSESSMENT
47 yo F with pmh stated above p/w    A:  Acute non perforated sigmoid diverticulitis      P:  CT findings above  Monitor CBC counts  Continue bowel rest as patient still not able to tolerate any by mouth  Anti-emetics, pain control, IVF, IV abx  Add bacid, cont PPI. Stop colace.   C diff neg  Repeat CTAP r/o microperf with ongoing pain however suspiscion is low  Eventual colonscopy outpt  DVT px    total time: 40 min  D/W nursing

## 2018-10-11 NOTE — PROGRESS NOTE ADULT - SUBJECTIVE AND OBJECTIVE BOX
48F w/ hx of diverticulitis x1 appx 5 years ago treated with IV antibiotics p/w 1 week hx of diarrhea and worsening LLQ abd pain. Denies fever. C/o nausea and weakness. Patient does not recall having a colonoscopy at the time of her first episode.     Feeling improved today. c/o diarrhea, Denies n/v.    Vital Signs Last 24 Hrs  T(C): 37.1 (11 Oct 2018 05:05), Max: 37.1 (11 Oct 2018 05:05)  T(F): 98.7 (11 Oct 2018 05:05), Max: 98.7 (11 Oct 2018 05:05)  HR: 63 (11 Oct 2018 05:05) (55 - 63)  BP: 131/86 (11 Oct 2018 05:05) (131/86 - 156/88)  RR: 18 (11 Oct 2018 05:05) (16 - 18)  SpO2: 99% (11 Oct 2018 05:05) (97% - 99%)    abd soft, non distended, mildly tender                          11.7   6.99  )-----------( 210      ( 11 Oct 2018 06:43 )             34.0   10-11    142  |  105  |  4<L>  ----------------------------<  105<H>  3.4<L>   |  30  |  0.66    Ca    8.7      11 Oct 2018 06:43

## 2018-10-11 NOTE — PROGRESS NOTE ADULT - SUBJECTIVE AND OBJECTIVE BOX
Interval History:    MEDICATIONS  (STANDING):  ciprofloxacin   IVPB 400 milliGRAM(s) IV Intermittent every 12 hours  citalopram 40 milliGRAM(s) Oral daily  enoxaparin Injectable 40 milliGRAM(s) SubCutaneous every 24 hours  influenza   Vaccine 0.5 milliLiter(s) IntraMuscular once  lactated ringers. 1000 milliLiter(s) (110 mL/Hr) IV Continuous <Continuous>  lactobacillus acidophilus 1 Tablet(s) Oral daily  metroNIDAZOLE  IVPB 500 milliGRAM(s) IV Intermittent every 8 hours  nicotine -  14 mG/24Hr(s) Patch 1 patch Transdermal daily  pantoprazole    Tablet 40 milliGRAM(s) Oral every 12 hours    MEDICATIONS  (PRN):  acetaminophen   Tablet .. 650 milliGRAM(s) Oral every 6 hours PRN Temp greater or equal to 38C (100.4F), Mild Pain (1 - 3), Moderate Pain (4 - 6)  diphenhydrAMINE   Injectable 25 milliGRAM(s) IV Push every 6 hours PRN use prior to dilaudid for itching  HYDROmorphone  Injectable 1 milliGRAM(s) IV Push every 4 hours PRN Severe Pain (7 - 10)  ketorolac   Injectable 30 milliGRAM(s) IV Push every 6 hours PRN Moderate Pain (4 - 6)  ondansetron Injectable 4 milliGRAM(s) IV Push every 8 hours PRN Nausea      Daily     Daily   BMI:   Change in Weight:  Vital Signs Last 24 Hrs  T(C): 37.3 (11 Oct 2018 12:50), Max: 37.3 (11 Oct 2018 12:50)  T(F): 99.2 (11 Oct 2018 12:50), Max: 99.2 (11 Oct 2018 12:50)  HR: 62 (11 Oct 2018 12:50) (58 - 63)  BP: 122/83 (11 Oct 2018 12:50) (122/83 - 154/84)  BP(mean): --  RR: 18 (11 Oct 2018 05:05) (18 - 18)  SpO2: 99% (11 Oct 2018 05:05) (98% - 99%)  I&O's Detail    11 Oct 2018 07:01  -  11 Oct 2018 16:40  --------------------------------------------------------  IN:    IV PiggyBack: 100 mL    Oral Fluid: 240 mL  Total IN: 340 mL    OUT:  Total OUT: 0 mL    Total NET: 340 mL          PHYSICAL EXAM  General:  Well developed, well nourished, alert and active, no pallor, NAD.  HEENT:    Normal appearance of conjunctiva, ears, nose, lips, oropharynx, and oral mucosa, anicteric.  Neck:  No masses, no asymmetry.  Lymph Nodes:  No lymphadenopathy.   Cardiovascular:  RRR normal S1/S2, no murmur.  Respiratory:  CTA B/L, normal respiratory effort.   Abdominal:   soft, no masses or mild LLQtenderness, normoactive BS, NT/ND, no HSM.  Extremities:   No clubbing or cyanosis, normal capillary refill, no edema.   Skin:   No rash, jaundice, lesions, eczema.   Musculoskeletal:  No joint swelling, erythema or tenderness.   Other:     Lab Results:                        11.7   6.99  )-----------( 210      ( 11 Oct 2018 06:43 )             34.0     10-11    142  |  105  |  4<L>  ----------------------------<  105<H>  3.4<L>   |  30  |  0.66    Ca    8.7      11 Oct 2018 06:43              Stool Results:      C Diff by PCR Result: Hollie (10-10 @ 17:30)      RADIOLOGY RESULTS:    SURGICAL PATHOLOGY:

## 2018-10-11 NOTE — PROGRESS NOTE ADULT - SUBJECTIVE AND OBJECTIVE BOX
Patient is a 48y old  Female who presents with a chief complaint of diverticulitis       SUBJECTIVE: abd pain  HPI:  49 yo F with pmh depression , cdiff, diverticulitis now p/w diffuse abd pain CT d/w sigmoid diverticulitis      10/9: patient abd less distended. No N/V. passing flatus. Had pain with ice chips per nursing. Afebrile. WBC trending down  10/10: had sever pain after clears. endorses 3 epsiodes of small quantity diarrhea in last 24 hours -  10/11: now having formed stools. Pain worsening per patient and not tolerating any liquids or solids        REVIEW OF SYSTEMS:    CONSTITUTIONAL: No weakness, fevers or chills  EYES/ENT: No visual changes;  No vertigo or throat pain   NECK: No pain or stiffness  RESPIRATORY: No cough, wheezing, hemoptysis; No shortness of breath  CARDIOVASCULAR: No chest pain or palpitations  GASTROINTESTINAL: No abdominal or epigastric pain. No nausea, vomiting, or hematemesis; No diarrhea or constipation. No melena or hematochezia.  GENITOURINARY: No dysuria, frequency or hematuria  NEUROLOGICAL: No numbness or weakness  SKIN: No itching, burning, rashes, or lesions   All other review of systems is negative unless indicated above        ICU Vital Signs Last 24 Hrs  T(C): 37.3 (11 Oct 2018 12:50), Max: 37.3 (11 Oct 2018 12:50)  T(F): 99.2 (11 Oct 2018 12:50), Max: 99.2 (11 Oct 2018 12:50)  HR: 62 (11 Oct 2018 12:50) (58 - 63)  BP: 122/83 (11 Oct 2018 12:50) (122/83 - 154/84)              CAPILLARY BLOOD GLUCOSE          PHYSICAL EXAM:    Constitutional: NAD, awake and alert, well-developed  HEENT: PERR, EOMI, Normal Hearing, MMM  Neck: Soft and supple, No LAD, No JVD  Respiratory: Breath sounds are clear bilaterally, No wheezing, rales or rhonchi  Cardiovascular: S1 and S2, regular rate and rhythm, no Murmurs, gallops or rubs  Gastrointestinal: + BS in all 4 quadrants, nt/nd  Vascular: 2+ peripheral pulses  Neurological: A/O x 3, no focal deficits  Musculoskeletal: 5/5 strength b/l upper and lower extremities  Skin: No rashes    MEDICATIONS:  MEDICATIONS  (STANDING):  ciprofloxacin   IVPB 400 milliGRAM(s) IV Intermittent every 12 hours  citalopram 40 milliGRAM(s) Oral daily  docusate sodium 100 milliGRAM(s) Oral two times a day  enoxaparin Injectable 40 milliGRAM(s) SubCutaneous every 24 hours  lactated ringers. 1000 milliLiter(s) (110 mL/Hr) IV Continuous <Continuous>  lactobacillus acidophilus 1 Tablet(s) Oral daily  metroNIDAZOLE  IVPB 500 milliGRAM(s) IV Intermittent every 8 hours  nicotine -  14 mG/24Hr(s) Patch 1 patch Transdermal daily  pantoprazole  Injectable 40 milliGRAM(s) IV Push two times a day      LABS: All Labs Reviewed:                        12.1   10.12 )-----------( 194      ( 09 Oct 2018 07:18 )             36.2     10-09    139  |  105  |  7   ----------------------------<  107<H>  3.7   |  25  |  0.52    Ca    8.4<L>      09 Oct 2018 07:18  Phos  3.6     10-09  Mg     1.6     10-09    TPro  7.6  /  Alb  4.1  /  TBili  0.6  /  DBili  x   /  AST  36  /  ALT  39  /  AlkPhos  114  10-07    PT/INR - ( 07 Oct 2018 16:00 )   PT: 10.3 sec;   INR: 0.95 ratio         PTT - ( 07 Oct 2018 16:00 )  PTT:27.5 sec          Blood Culture:     RADIOLOGY/EKG:    < from: CT Abdomen and Pelvis w/ Oral Cont and w/ IV Cont (10.07.18 @ 20:17) >    IMPRESSION: Acute diverticulitis at the junction of descending and   sigmoid colon.        < end of copied text >

## 2018-10-12 LAB
ANION GAP SERPL CALC-SCNC: 7 MMOL/L — SIGNIFICANT CHANGE UP (ref 5–17)
BUN SERPL-MCNC: 4 MG/DL — LOW (ref 7–23)
C DIFF BY PCR RESULT: SIGNIFICANT CHANGE UP
C DIFF TOX GENS STL QL NAA+PROBE: SIGNIFICANT CHANGE UP
CALCIUM SERPL-MCNC: 8.2 MG/DL — LOW (ref 8.5–10.1)
CHLORIDE SERPL-SCNC: 106 MMOL/L — SIGNIFICANT CHANGE UP (ref 96–108)
CO2 SERPL-SCNC: 27 MMOL/L — SIGNIFICANT CHANGE UP (ref 22–31)
CREAT SERPL-MCNC: 0.66 MG/DL — SIGNIFICANT CHANGE UP (ref 0.5–1.3)
CULTURE RESULTS: SIGNIFICANT CHANGE UP
GLUCOSE SERPL-MCNC: 131 MG/DL — HIGH (ref 70–99)
HCT VFR BLD CALC: 34.9 % — SIGNIFICANT CHANGE UP (ref 34.5–45)
HGB BLD-MCNC: 12 G/DL — SIGNIFICANT CHANGE UP (ref 11.5–15.5)
MCHC RBC-ENTMCNC: 34 PG — SIGNIFICANT CHANGE UP (ref 27–34)
MCHC RBC-ENTMCNC: 34.4 GM/DL — SIGNIFICANT CHANGE UP (ref 32–36)
MCV RBC AUTO: 98.9 FL — SIGNIFICANT CHANGE UP (ref 80–100)
NRBC # BLD: 0 /100 WBCS — SIGNIFICANT CHANGE UP (ref 0–0)
PLATELET # BLD AUTO: 201 K/UL — SIGNIFICANT CHANGE UP (ref 150–400)
POTASSIUM SERPL-MCNC: 3.4 MMOL/L — LOW (ref 3.5–5.3)
POTASSIUM SERPL-SCNC: 3.4 MMOL/L — LOW (ref 3.5–5.3)
RBC # BLD: 3.53 M/UL — LOW (ref 3.8–5.2)
RBC # FLD: 12.2 % — SIGNIFICANT CHANGE UP (ref 10.3–14.5)
SODIUM SERPL-SCNC: 140 MMOL/L — SIGNIFICANT CHANGE UP (ref 135–145)
SPECIMEN SOURCE: SIGNIFICANT CHANGE UP
WBC # BLD: 7.64 K/UL — SIGNIFICANT CHANGE UP (ref 3.8–10.5)
WBC # FLD AUTO: 7.64 K/UL — SIGNIFICANT CHANGE UP (ref 3.8–10.5)

## 2018-10-12 PROCEDURE — 74177 CT ABD & PELVIS W/CONTRAST: CPT | Mod: 26

## 2018-10-12 RX ORDER — POTASSIUM CHLORIDE 20 MEQ
40 PACKET (EA) ORAL EVERY 4 HOURS
Qty: 0 | Refills: 0 | Status: COMPLETED | OUTPATIENT
Start: 2018-10-12 | End: 2018-10-12

## 2018-10-12 RX ORDER — VANCOMYCIN HCL 1 G
125 VIAL (EA) INTRAVENOUS EVERY 6 HOURS
Qty: 0 | Refills: 0 | Status: DISCONTINUED | OUTPATIENT
Start: 2018-10-12 | End: 2018-10-13

## 2018-10-12 RX ADMIN — CITALOPRAM 40 MILLIGRAM(S): 10 TABLET, FILM COATED ORAL at 12:16

## 2018-10-12 RX ADMIN — Medication 125 MILLIGRAM(S): at 22:48

## 2018-10-12 RX ADMIN — Medication 200 MILLIGRAM(S): at 07:58

## 2018-10-12 RX ADMIN — Medication 40 MILLIEQUIVALENT(S): at 12:16

## 2018-10-12 RX ADMIN — Medication 30 MILLIGRAM(S): at 17:57

## 2018-10-12 RX ADMIN — Medication 100 MILLIGRAM(S): at 04:35

## 2018-10-12 RX ADMIN — PANTOPRAZOLE SODIUM 40 MILLIGRAM(S): 20 TABLET, DELAYED RELEASE ORAL at 17:56

## 2018-10-12 RX ADMIN — ENOXAPARIN SODIUM 40 MILLIGRAM(S): 100 INJECTION SUBCUTANEOUS at 12:16

## 2018-10-12 RX ADMIN — Medication 100 MILLIGRAM(S): at 21:05

## 2018-10-12 RX ADMIN — Medication 100 MILLIGRAM(S): at 14:49

## 2018-10-12 RX ADMIN — Medication 1 PATCH: at 12:16

## 2018-10-12 RX ADMIN — PANTOPRAZOLE SODIUM 40 MILLIGRAM(S): 20 TABLET, DELAYED RELEASE ORAL at 04:35

## 2018-10-12 RX ADMIN — Medication 1 PATCH: at 12:19

## 2018-10-12 RX ADMIN — Medication 40 MILLIEQUIVALENT(S): at 16:10

## 2018-10-12 RX ADMIN — Medication 30 MILLIGRAM(S): at 15:46

## 2018-10-12 RX ADMIN — Medication 1 TABLET(S): at 12:16

## 2018-10-12 RX ADMIN — Medication 125 MILLIGRAM(S): at 17:57

## 2018-10-12 NOTE — PROGRESS NOTE ADULT - SUBJECTIVE AND OBJECTIVE BOX
doing well no acute events overnight afebrile avss.   ICU Vital Signs Last 24 Hrs  T(C): 37.2 (12 Oct 2018 04:27), Max: 37.3 (11 Oct 2018 12:50)  T(F): 99 (12 Oct 2018 04:27), Max: 99.2 (11 Oct 2018 12:50)  HR: 66 (12 Oct 2018 04:27) (62 - 68)  BP: 150/87 (12 Oct 2018 04:27) (122/83 - 150/87)  BP(mean): --  ABP: --  ABP(mean): --  RR: 18 (12 Oct 2018 04:27) (17 - 18)  SpO2: 98% (12 Oct 2018 04:27) (98% - 99%)        abd soft, non distended, mildly tender                              12.0   7.64  )-----------( 201      ( 12 Oct 2018 07:26 )             34.9   10-12    140  |  106  |  4<L>  ----------------------------<  131<H>  3.4<L>   |  27  |  0.66    Ca    8.2<L>      12 Oct 2018 07:26

## 2018-10-12 NOTE — PROGRESS NOTE ADULT - ASSESSMENT
49 yo F with pmh stated above p/w    A:  Acute non perforated sigmoid diverticulitis/Mod ascending colitis (infectious vs inflammatory)      P:  CT findings above  Monitor CBC counts  would not continue LFD as patient CT scan clearly shows progression of symptoms  IF can tolerate would give liquid diet only.  Stop Cipro. cont flagyl. Start vanco po and obtain stool studies including c diff/PCR  Add bacid, cont PPI. Stop colace.   Eventual colonscopy outpt  DVT px    total time: 45 min  D/W nursing

## 2018-10-12 NOTE — PROGRESS NOTE ADULT - SUBJECTIVE AND OBJECTIVE BOX
Interval History:    MEDICATIONS  (STANDING):  ciprofloxacin   IVPB 400 milliGRAM(s) IV Intermittent every 12 hours  citalopram 40 milliGRAM(s) Oral daily  enoxaparin Injectable 40 milliGRAM(s) SubCutaneous every 24 hours  influenza   Vaccine 0.5 milliLiter(s) IntraMuscular once  lactobacillus acidophilus 1 Tablet(s) Oral daily  metroNIDAZOLE  IVPB 500 milliGRAM(s) IV Intermittent every 8 hours  nicotine -  14 mG/24Hr(s) Patch 1 patch Transdermal daily  pantoprazole    Tablet 40 milliGRAM(s) Oral every 12 hours  potassium chloride    Tablet ER 40 milliEquivalent(s) Oral every 4 hours    MEDICATIONS  (PRN):  acetaminophen   Tablet .. 650 milliGRAM(s) Oral every 6 hours PRN Temp greater or equal to 38C (100.4F), Mild Pain (1 - 3), Moderate Pain (4 - 6)  diphenhydrAMINE   Injectable 25 milliGRAM(s) IV Push every 6 hours PRN use prior to dilaudid for itching  HYDROmorphone  Injectable 1 milliGRAM(s) IV Push every 4 hours PRN Severe Pain (7 - 10)  ketorolac   Injectable 30 milliGRAM(s) IV Push every 6 hours PRN Moderate Pain (4 - 6)  ondansetron Injectable 4 milliGRAM(s) IV Push every 8 hours PRN Nausea      Daily     Daily   BMI:   Change in Weight:  Vital Signs Last 24 Hrs  T(C): 37.2 (12 Oct 2018 04:27), Max: 37.3 (11 Oct 2018 12:50)  T(F): 99 (12 Oct 2018 04:27), Max: 99.2 (11 Oct 2018 12:50)  HR: 66 (12 Oct 2018 04:27) (62 - 68)  BP: 150/87 (12 Oct 2018 04:27) (122/83 - 150/87)  BP(mean): --  RR: 18 (12 Oct 2018 04:27) (17 - 18)  SpO2: 98% (12 Oct 2018 04:27) (98% - 99%)  I&O's Detail    11 Oct 2018 07:01  -  12 Oct 2018 07:00  --------------------------------------------------------  IN:    IV PiggyBack: 300 mL    lactated ringers.: 1500 mL    Oral Fluid: 240 mL  Total IN: 2040 mL    OUT:  Total OUT: 0 mL    Total NET: 2040 mL          PHYSICAL EXAM  General:  Well developed, well nourished, alert and active, no pallor, NAD.  HEENT:    Normal appearance of conjunctiva, ears, nose, lips, oropharynx, and oral mucosa, anicteric.  Neck:  No masses, no asymmetry.  Lymph Nodes:  No lymphadenopathy.   Cardiovascular:  RRR normal S1/S2, no murmur.  Respiratory:  CTA B/L, normal respiratory effort.   Abdominal:   soft, no masses LLQ tenderness, normoactive BS, NT/ND, no HSM.  Extremities:   No clubbing or cyanosis, normal capillary refill, no edema.   Skin:   No rash, jaundice, lesions, eczema.   Musculoskeletal:  No joint swelling, erythema or tenderness.   Other:     Lab Results:                        12.0   7.64  )-----------( 201      ( 12 Oct 2018 07:26 )             34.9     10-12    140  |  106  |  4<L>  ----------------------------<  131<H>  3.4<L>   |  27  |  0.66    Ca    8.2<L>      12 Oct 2018 07:26              Stool Results:          RADIOLOGY RESULTS:    SURGICAL PATHOLOGY:

## 2018-10-12 NOTE — PROGRESS NOTE ADULT - ASSESSMENT
48F w/ acute diverticulitis    -clears  -repeat ct scan today will f/ui results  -iv abx  -serial abd exams  -oob

## 2018-10-12 NOTE — PROGRESS NOTE ADULT - SUBJECTIVE AND OBJECTIVE BOX
Patient is a 48y old  Female who presents with a chief complaint of diverticulitis       SUBJECTIVE: abd pain  HPI:  47 yo F with pmh depression , cdiff, diverticulitis now p/w diffuse abd pain CT d/w sigmoid diverticulitis      10/9: patient abd less distended. No N/V. passing flatus. Had pain with ice chips per nursing. Afebrile. WBC trending down  10/10: had sever pain after clears. endorses 3 epsiodes of small quantity diarrhea in last 24 hours -  10/11: now having formed stools. Pain worsening per patient and not tolerating any liquids or solids  10/12: having profuse watery diarrhea with worsening abd pain; she is crying and upset that she is not improving      REVIEW OF SYSTEMS:    CONSTITUTIONAL: No weakness, fevers or chills  EYES/ENT: No visual changes;  No vertigo or throat pain   NECK: No pain or stiffness  RESPIRATORY: No cough, wheezing, hemoptysis; No shortness of breath  CARDIOVASCULAR: No chest pain or palpitations  GASTROINTESTINAL: No abdominal or epigastric pain. No nausea, vomiting, or hematemesis; No diarrhea or constipation. No melena or hematochezia.  GENITOURINARY: No dysuria, frequency or hematuria  NEUROLOGICAL: No numbness or weakness  SKIN: No itching, burning, rashes, or lesions   All other review of systems is negative unless indicated above      ICU Vital Signs Last 24 Hrs  T(C): 37.3 (12 Oct 2018 12:16), Max: 37.3 (11 Oct 2018 18:09)  T(F): 99.2 (12 Oct 2018 12:16), Max: 99.2 (11 Oct 2018 18:09)  HR: 77 (12 Oct 2018 12:16) (66 - 77)  BP: 157/89 (12 Oct 2018 12:16) (132/86 - 157/89)  BP(mean): --  ABP: --  ABP(mean): --  RR: 19 (12 Oct 2018 12:16) (17 - 19)  SpO2: 98% (12 Oct 2018 12:16) (98% - 99%)              CAPILLARY BLOOD GLUCOSE          PHYSICAL EXAM:    Constitutional: NAD, awake and alert, well-developed  HEENT: PERR, EOMI, Normal Hearing, MMM  Neck: Soft and supple, No LAD, No JVD  Respiratory: Breath sounds are clear bilaterally, No wheezing, rales or rhonchi  Cardiovascular: S1 and S2, regular rate and rhythm, no Murmurs, gallops or rubs  Gastrointestinal: + BS in all 4 quadrants, nt/nd, hyperactive bowel sounds  Vascular: 2+ peripheral pulses  Neurological: A/O x 3, no focal deficits  Musculoskeletal: 5/5 strength b/l upper and lower extremities  Skin: No rashes    MEDICATIONS:  MEDICATIONS  (STANDING):  ciprofloxacin   IVPB 400 milliGRAM(s) IV Intermittent every 12 hours  citalopram 40 milliGRAM(s) Oral daily  docusate sodium 100 milliGRAM(s) Oral two times a day  enoxaparin Injectable 40 milliGRAM(s) SubCutaneous every 24 hours  lactated ringers. 1000 milliLiter(s) (110 mL/Hr) IV Continuous <Continuous>  lactobacillus acidophilus 1 Tablet(s) Oral daily  metroNIDAZOLE  IVPB 500 milliGRAM(s) IV Intermittent every 8 hours  nicotine -  14 mG/24Hr(s) Patch 1 patch Transdermal daily  pantoprazole  Injectable 40 milliGRAM(s) IV Push two times a day      LABS: All Labs Reviewed:                        12.1   10.12 )-----------( 194      ( 09 Oct 2018 07:18 )             36.2     10-09    139  |  105  |  7   ----------------------------<  107<H>  3.7   |  25  |  0.52    Ca    8.4<L>      09 Oct 2018 07:18  Phos  3.6     10-09  Mg     1.6     10-09    TPro  7.6  /  Alb  4.1  /  TBili  0.6  /  DBili  x   /  AST  36  /  ALT  39  /  AlkPhos  114  10-07    PT/INR - ( 07 Oct 2018 16:00 )   PT: 10.3 sec;   INR: 0.95 ratio         PTT - ( 07 Oct 2018 16:00 )  PTT:27.5 sec          Blood Culture:     RADIOLOGY/EKG:    < from: CT Abdomen and Pelvis w/ Oral Cont and w/ IV Cont (10.07.18 @ 20:17) >    IMPRESSION: Acute diverticulitis at the junction of descending and   sigmoid colon.        < end of copied text >

## 2018-10-13 ENCOUNTER — TRANSCRIPTION ENCOUNTER (OUTPATIENT)
Age: 48
End: 2018-10-13

## 2018-10-13 VITALS
HEART RATE: 65 BPM | RESPIRATION RATE: 18 BRPM | OXYGEN SATURATION: 98 % | TEMPERATURE: 99 F | DIASTOLIC BLOOD PRESSURE: 86 MMHG | SYSTOLIC BLOOD PRESSURE: 128 MMHG

## 2018-10-13 LAB
ANION GAP SERPL CALC-SCNC: 6 MMOL/L — SIGNIFICANT CHANGE UP (ref 5–17)
BUN SERPL-MCNC: 5 MG/DL — LOW (ref 7–23)
CALCIUM SERPL-MCNC: 8.5 MG/DL — SIGNIFICANT CHANGE UP (ref 8.5–10.1)
CHLORIDE SERPL-SCNC: 108 MMOL/L — SIGNIFICANT CHANGE UP (ref 96–108)
CO2 SERPL-SCNC: 28 MMOL/L — SIGNIFICANT CHANGE UP (ref 22–31)
CREAT SERPL-MCNC: 0.65 MG/DL — SIGNIFICANT CHANGE UP (ref 0.5–1.3)
GLUCOSE SERPL-MCNC: 108 MG/DL — HIGH (ref 70–99)
HCT VFR BLD CALC: 36.5 % — SIGNIFICANT CHANGE UP (ref 34.5–45)
HGB BLD-MCNC: 12.5 G/DL — SIGNIFICANT CHANGE UP (ref 11.5–15.5)
MCHC RBC-ENTMCNC: 33.6 PG — SIGNIFICANT CHANGE UP (ref 27–34)
MCHC RBC-ENTMCNC: 34.2 GM/DL — SIGNIFICANT CHANGE UP (ref 32–36)
MCV RBC AUTO: 98.1 FL — SIGNIFICANT CHANGE UP (ref 80–100)
NRBC # BLD: 0 /100 WBCS — SIGNIFICANT CHANGE UP (ref 0–0)
PLATELET # BLD AUTO: 227 K/UL — SIGNIFICANT CHANGE UP (ref 150–400)
POTASSIUM SERPL-MCNC: 4 MMOL/L — SIGNIFICANT CHANGE UP (ref 3.5–5.3)
POTASSIUM SERPL-SCNC: 4 MMOL/L — SIGNIFICANT CHANGE UP (ref 3.5–5.3)
RBC # BLD: 3.72 M/UL — LOW (ref 3.8–5.2)
RBC # FLD: 12.6 % — SIGNIFICANT CHANGE UP (ref 10.3–14.5)
SODIUM SERPL-SCNC: 142 MMOL/L — SIGNIFICANT CHANGE UP (ref 135–145)
WBC # BLD: 6.62 K/UL — SIGNIFICANT CHANGE UP (ref 3.8–10.5)
WBC # FLD AUTO: 6.62 K/UL — SIGNIFICANT CHANGE UP (ref 3.8–10.5)

## 2018-10-13 PROCEDURE — 99285 EMERGENCY DEPT VISIT HI MDM: CPT

## 2018-10-13 RX ORDER — METRONIDAZOLE 500 MG
1 TABLET ORAL
Qty: 21 | Refills: 0 | OUTPATIENT
Start: 2018-10-13 | End: 2018-10-19

## 2018-10-13 RX ORDER — CITALOPRAM 10 MG/1
1 TABLET, FILM COATED ORAL
Qty: 0 | Refills: 0 | COMMUNITY
Start: 2018-10-13

## 2018-10-13 RX ORDER — METRONIDAZOLE 500 MG
500 TABLET ORAL EVERY 8 HOURS
Qty: 0 | Refills: 0 | Status: DISCONTINUED | OUTPATIENT
Start: 2018-10-13 | End: 2018-10-13

## 2018-10-13 RX ADMIN — Medication 125 MILLIGRAM(S): at 12:01

## 2018-10-13 RX ADMIN — Medication 100 MILLIGRAM(S): at 05:13

## 2018-10-13 RX ADMIN — Medication 1 TABLET(S): at 12:00

## 2018-10-13 RX ADMIN — PANTOPRAZOLE SODIUM 40 MILLIGRAM(S): 20 TABLET, DELAYED RELEASE ORAL at 16:45

## 2018-10-13 RX ADMIN — ENOXAPARIN SODIUM 40 MILLIGRAM(S): 100 INJECTION SUBCUTANEOUS at 12:00

## 2018-10-13 RX ADMIN — CITALOPRAM 40 MILLIGRAM(S): 10 TABLET, FILM COATED ORAL at 12:00

## 2018-10-13 RX ADMIN — Medication 125 MILLIGRAM(S): at 05:12

## 2018-10-13 RX ADMIN — Medication 500 MILLIGRAM(S): at 14:59

## 2018-10-13 RX ADMIN — PANTOPRAZOLE SODIUM 40 MILLIGRAM(S): 20 TABLET, DELAYED RELEASE ORAL at 05:12

## 2018-10-13 RX ADMIN — Medication 1 PATCH: at 12:00

## 2018-10-13 NOTE — PROGRESS NOTE ADULT - SUBJECTIVE AND OBJECTIVE BOX
Patient seen this AM, however refused to be examined at this time.     ICU Vital Signs Last 24 Hrs  T(C): 36.5 (13 Oct 2018 05:37), Max: 37.3 (12 Oct 2018 12:16)  T(F): 97.7 (13 Oct 2018 05:37), Max: 99.2 (12 Oct 2018 12:16)  HR: 60 (13 Oct 2018 05:37) (60 - 78)  BP: 146/85 (13 Oct 2018 05:37) (140/86 - 157/89)  BP(mean): --  ABP: --  ABP(mean): --  RR: 18 (13 Oct 2018 05:37) (18 - 19)  SpO2: 100% (13 Oct 2018 05:37) (98% - 100%)        Refused examination this AM                          12.5   6.62  )-----------( 227      ( 13 Oct 2018 07:14 )             36.5   10-13    142  |  108  |  5<L>  ----------------------------<  108<H>  4.0   |  28  |  0.65    Ca    8.5      13 Oct 2018 07:14

## 2018-10-13 NOTE — DISCHARGE NOTE ADULT - MEDICATION SUMMARY - MEDICATIONS TO STOP TAKING
I will STOP taking the medications listed below when I get home from the hospital:    azithromycin 250 mg oral tablet  -- 2 tab(s) by mouth on day one then 1 tab daily for 4 days  -- Do not take dairy products, antacids, or iron preparations within one hour of this medication.  Finish all this medication unless otherwise directed by prescriber.    predniSONE 20 mg oral tablet  -- 2 tab(s) by mouth once a day  -- It is very important that you take or use this exactly as directed.  Do not skip doses or discontinue unless directed by your doctor.  Obtain medical advice before taking any non-prescription drugs as some may affect the action of this medication.  Take with food or milk.

## 2018-10-13 NOTE — PROGRESS NOTE ADULT - ASSESSMENT
48F w/ acute diverticulitis    -Tolerating diet  -continue medical management as needed  -no acute issues  -no urgent surgical intervention  -can follow up as outpatient, will need c-scope  -Thank you for the courtesy of this consult  Will sign off at this time    Discussed with Dr. Arthur

## 2018-10-13 NOTE — DISCHARGE NOTE ADULT - CARE PLAN
Principal Discharge DX:	Acute diverticulitis  Goal:	to be free of abdominal symptoms  Assessment and plan of treatment:	Take medication as directed.     PCP- follow up in 1 week. Bring these papers with you to that appointment so your PCP can request records from your hospital stay.    You will also need to follow up with GI for colonoscopy in 2-4 weeks.

## 2018-10-13 NOTE — DISCHARGE NOTE ADULT - PLAN OF CARE
to be free of abdominal symptoms Take medication as directed.     PCP- follow up in 1 week. Bring these papers with you to that appointment so your PCP can request records from your hospital stay.    You will also need to follow up with GI for colonoscopy in 2-4 weeks.

## 2018-10-13 NOTE — DISCHARGE NOTE ADULT - PATIENT PORTAL LINK FT
You can access the [facility] Patient Portal, offered by [facility], by registering with the following website: [email or web address]

## 2018-10-13 NOTE — DISCHARGE NOTE ADULT - MEDICATION SUMMARY - MEDICATIONS TO TAKE
I will START or STAY ON the medications listed below when I get home from the hospital:    metroNIDAZOLE 500 mg oral tablet  -- 1 tab(s) by mouth 3 times a day  -- Indication: For colitis    citalopram 40 mg oral tablet  -- 1 tab(s) by mouth once a day  -- Indication: For mood    Tessalon Perles 100 mg oral capsule  -- 1 cap(s) by mouth 3 times a day  -- May cause drowsiness.  Alcohol may intensify this effect.  Use care when operating dangerous machinery.  Swallow whole.  Do not crush.    -- Indication: For cough    Ventolin HFA 90 mcg/inh inhalation aerosol  -- 2 puff(s) inhaled every 6 hours  -- For inhalation only.  It is very important that you take or use this exactly as directed.  Do not skip doses or discontinue unless directed by your doctor.  Obtain medical advice before taking any non-prescription drugs as some may affect the action of this medication.  Shake well before use.    -- Indication: For breathing

## 2018-10-13 NOTE — DISCHARGE NOTE ADULT - HOSPITAL COURSE
HPI:  47 yo F with pmh depression , cdiff, diverticulitis now p/w diffuse abd pain CT d/w sigmoid diverticulitis    10/9: patient abd less distended. No N/V. passing flatus. Had pain with ice chips per nursing. Afebrile. WBC trending down  10/10: had sever pain after clears. endorses 3 epsiodes of small quantity diarrhea in last 24 hours -  10/11: now having formed stools. Pain worsening per patient and not tolerating any liquids or solids  10/12: having profuse watery diarrhea with worsening abd pain; she is crying and upset that she is not improving  10/13: improved with cessation of cipro. tolerating regular food. gas. improving BMs. asking to go home tonight. told her conditional discharge possible      Acute non perforated sigmoid diverticulitis/Mod ascending colitis (infectious vs inflammatory)  CT findings with diverticulitis  C diff PCR negative, GI PCR negative  now on PO vanc and flagyl, will continue flagyl only  Eventual colonscopy outpt  DVT px      total time, including coordination of care: 38 minutes  GEN: NAD  EYES: eomi  CV: no edema  RESP: unlabored

## 2018-10-13 NOTE — PROGRESS NOTE ADULT - REASON FOR ADMISSION
Diverticulitis
abd pain
diverticulitis

## 2018-10-14 LAB
CULTURE RESULTS: SIGNIFICANT CHANGE UP
SPECIMEN SOURCE: SIGNIFICANT CHANGE UP

## 2018-10-16 LAB
CULTURE RESULTS: SIGNIFICANT CHANGE UP
SPECIMEN SOURCE: SIGNIFICANT CHANGE UP

## 2018-10-18 DIAGNOSIS — F17.290 NICOTINE DEPENDENCE, OTHER TOBACCO PRODUCT, UNCOMPLICATED: ICD-10-CM

## 2018-10-18 DIAGNOSIS — K57.32 DIVERTICULITIS OF LARGE INTESTINE WITHOUT PERFORATION OR ABSCESS WITHOUT BLEEDING: ICD-10-CM

## 2018-10-18 DIAGNOSIS — F32.9 MAJOR DEPRESSIVE DISORDER, SINGLE EPISODE, UNSPECIFIED: ICD-10-CM

## 2018-10-18 DIAGNOSIS — R10.32 LEFT LOWER QUADRANT PAIN: ICD-10-CM

## 2018-10-18 DIAGNOSIS — K52.9 NONINFECTIVE GASTROENTERITIS AND COLITIS, UNSPECIFIED: ICD-10-CM

## 2019-10-07 NOTE — DISCHARGE NOTE ADULT - CONTRAINDICATIONS & PRECAUTIONS (SELECT ALL THAT APPLY)
Look here.        Dear Kwabena, 
Patient/surrogate refused vaccine...

## 2019-10-15 ENCOUNTER — EMERGENCY (EMERGENCY)
Facility: HOSPITAL | Age: 49
LOS: 0 days | Discharge: HOME | End: 2019-10-15
Attending: EMERGENCY MEDICINE
Payer: SELF-PAY

## 2019-10-15 VITALS
DIASTOLIC BLOOD PRESSURE: 97 MMHG | TEMPERATURE: 97 F | SYSTOLIC BLOOD PRESSURE: 148 MMHG | HEART RATE: 73 BPM | RESPIRATION RATE: 17 BRPM | OXYGEN SATURATION: 98 %

## 2019-10-15 VITALS
DIASTOLIC BLOOD PRESSURE: 104 MMHG | TEMPERATURE: 98 F | HEART RATE: 69 BPM | RESPIRATION RATE: 18 BRPM | OXYGEN SATURATION: 100 % | SYSTOLIC BLOOD PRESSURE: 156 MMHG

## 2019-10-15 DIAGNOSIS — M62.830 MUSCLE SPASM OF BACK: ICD-10-CM

## 2019-10-15 DIAGNOSIS — M43.6 TORTICOLLIS: ICD-10-CM

## 2019-10-15 DIAGNOSIS — M54.2 CERVICALGIA: ICD-10-CM

## 2019-10-15 DIAGNOSIS — M25.519 PAIN IN UNSPECIFIED SHOULDER: ICD-10-CM

## 2019-10-15 PROCEDURE — 96375 TX/PRO/DX INJ NEW DRUG ADDON: CPT

## 2019-10-15 PROCEDURE — 99284 EMERGENCY DEPT VISIT MOD MDM: CPT | Mod: 25

## 2019-10-15 PROCEDURE — 96374 THER/PROPH/DIAG INJ IV PUSH: CPT

## 2019-10-15 PROCEDURE — 99284 EMERGENCY DEPT VISIT MOD MDM: CPT

## 2019-10-15 RX ORDER — DIAZEPAM 5 MG
5 TABLET ORAL ONCE
Refills: 0 | Status: DISCONTINUED | OUTPATIENT
Start: 2019-10-15 | End: 2019-10-15

## 2019-10-15 RX ORDER — DIAZEPAM 5 MG
1 TABLET ORAL
Qty: 9 | Refills: 0
Start: 2019-10-15 | End: 2019-10-17

## 2019-10-15 RX ORDER — MORPHINE SULFATE 50 MG/1
4 CAPSULE, EXTENDED RELEASE ORAL ONCE
Refills: 0 | Status: DISCONTINUED | OUTPATIENT
Start: 2019-10-15 | End: 2019-10-15

## 2019-10-15 RX ORDER — IBUPROFEN 200 MG
1 TABLET ORAL
Qty: 28 | Refills: 0
Start: 2019-10-15 | End: 2019-10-21

## 2019-10-15 RX ORDER — ONDANSETRON 8 MG/1
4 TABLET, FILM COATED ORAL ONCE
Refills: 0 | Status: COMPLETED | OUTPATIENT
Start: 2019-10-15 | End: 2019-10-15

## 2019-10-15 RX ORDER — KETOROLAC TROMETHAMINE 30 MG/ML
30 SYRINGE (ML) INJECTION ONCE
Refills: 0 | Status: DISCONTINUED | OUTPATIENT
Start: 2019-10-15 | End: 2019-10-15

## 2019-10-15 RX ADMIN — Medication 30 MILLIGRAM(S): at 18:35

## 2019-10-15 RX ADMIN — ONDANSETRON 4 MILLIGRAM(S): 8 TABLET, FILM COATED ORAL at 18:35

## 2019-10-15 RX ADMIN — Medication 5 MILLIGRAM(S): at 18:36

## 2019-10-15 RX ADMIN — MORPHINE SULFATE 4 MILLIGRAM(S): 50 CAPSULE, EXTENDED RELEASE ORAL at 18:35

## 2019-10-15 NOTE — ED STATDOCS - CARE PROVIDER_API CALL
Ben Nelson)  Internal Medicine  [address] [place] [address]  Phone: [phone]  Fax: [phone]  Follow Up Time:

## 2019-10-15 NOTE — ED STATDOCS - PATIENT PORTAL LINK FT
You can access the FollowMyHealth Patient Portal offered by [facility] by registering at the following website: [email or web address]. By joining [employer]’s FollowMyHealth portal, you will also be able to view your health information using other applications (apps) compatible with our system.

## 2019-10-15 NOTE — ED STATDOCS - NSFOLLOWUPINSTRUCTIONS_ED_ALL_ED_FT
Acute Torticollis, Adult  Torticollis is a condition in which the muscles of the neck tighten (contract) abnormally, causing the neck to twist and the head to move into an unnatural position. Torticollis that develops suddenly is called acute torticollis. People with acute torticollis may have trouble turning their head. The condition can be painful and may range from mild to severe.  What are the causes?  This condition may be caused by:  Sleeping in an awkward position (common).Extending or twisting the neck muscles beyond their normal position.An injury to the neck muscles.An infection.A tumor.Certain medicines.Long-lasting spasms of the neck muscles.In some cases, the cause may not be known.  What increases the risk?  You are more likely to develop this condition if:  You have a condition associated with loose ligaments, such as Down syndrome.You have a brain condition that affects vision, such as strabismus.What are the signs or symptoms?  The main symptom of this condition is tilting of the head to one side. Other symptoms include:  Pain in the neck.Trouble turning the head from side to side or up and down.How is this diagnosed?  This condition may be diagnosed based on:  A physical exam.Your medical history.Imaging tests, such as:  An X-ray.An ultrasound.A CT scan.An MRI.How is this treated?  Treatment for this condition depends on what is causing the condition. Mild cases may go away without treatment. Treatment for more serious cases may include:  Medicines or shots to relax the muscles.Other medicines, such as antibiotics to treat the underlying cause.Wearing a soft neck collar.Physical therapy and stretching to improve neck strength and flexibility.Neck massage.In severe cases, surgery may be needed to repair dislocated or broken bones or to treat nerves in the neck.  Follow these instructions at home:  Image   Take over-the-counter and prescription medicines only as told by your health care provider.Do stretching exercises and massage your neck as told by your health care provider.If directed, apply heat to the affected area as often as told by your health care provider. Use the heat source that your health care provider recommends, such as a moist heat pack or a heating pad.  Place a towel between your skin and the heat source.Leave the heat on for 20–30 minutes.Remove the heat if your skin turns bright red. This is especially important if you are unable to feel pain, heat, or cold. You may have a greater risk of getting burned.If you wake up with torticollis after sleeping, check your bed or sleeping area. Look for lumpy pillows or unusual objects. Make sure your bed and sleeping area are comfortable.Keep all follow-up visits as told by your health care provider. This is important.Contact a health care provider if:  You have a fever.Your symptoms do not improve or they get worse.Get help right away if:  You have trouble breathing.You develop noisy breathing (stridor).You start to drool.You have trouble swallowing or pain when swallowing.You develop numbness or weakness in your hands or feet.You have changes in your speech, understanding, or vision.You are in severe pain.You cannot move your head or neck.Summary  Torticollis is a condition in which the muscles of the neck tighten (contract) abnormally, causing the neck to twist and the head to move into an unnatural position. Torticollis that develops suddenly is called acute torticollis.Treatment for this condition depends on what is causing the condition. Mild cases may go away without treatment.Do stretching exercises and massage your neck as told by your health care provider. You may also be instructed to apply heat to the area.Contact your health care provider if your symptoms do not improve or they get worse.This information is not intended to replace advice given to you by your health care provider. Make sure you discuss any questions you have with your health care provider.    Document Released: 12/15/2001 Document Revised: 02/15/2018 Document Reviewed: 02/15/2018  [employer] Patient Education © 2019 [employer]

## 2019-10-15 NOTE — SBIRT NOTE ADULT - NSSBIRTSCREENAVAIL_GEN_A_CORE
No NAME: MAME BURGESS   MRN: [id number]  GA:  35.2     DOL:  11     CA: 36.5    Health Issues - Problem Dx  -  -Breech presentation  -RDS  -Feeding issues  -Bilateral Tension pneumothoraces with CT placement  -Hyperbilirubinemia    Active Diagnoses: RDS, breech, feeding issues    Resolved Diagnoses: b/l pneumothorax s/p chest tube, r/o sepsis, hypoglycemia    Overnight Events: no acute events  Vital Signs Last 24 Hrs  T(C): 36.4 (2018 10:54), Max: 36.9 (2018 20:00)  T(F): 97.5 (2018 10:54), Max: 98.4 (2018 20:00)  HR: 109 (2018 10:54) (109 - 140)  BP: 75/47 (2018 07:30) (69/34 - 75/47)  BP(mean): 48 (2018 20:00) (48 - 48)  RR: 52 (2018 10:54) (36 - 60)  SpO2: 100% (2018 10:54) (99% - 100%)    Drug Dosing Weight  Height (cm): 49 (2018 07:14)  Weight (kg): 2.55 (2018 07:14)    RESP:  RR: 52 (2018 10:54) (36 - 60)  SpO2: 100% (2018 10:54) (99% - 100%)  - RA    CVS:  HR: 109 (2018 10:54) (109 - 140)  BP: 75/47 (2018 07:30) (69/34 - 75/47)  BP(mean): 48 (2018 20:00) (48 - 48    FEN:  - Weight 2361g -16g  - Po feeding Ksim 19kcal at 50ml q 3 hour- last 4 PO feeds: (23,17,25,17)  - TF 168ml/kg/day   - wdx5, UO 1.36    HEME:  none at the moment     ID:  T(C): 36.4 (2018 10:54), Max: 36.9 (2018 20:00)  T(F): 97.5 (2018 10:54), Max: 98.4 (2018 20:00)    GI/:  - stools x4    PHYSICAL EXAM:  Gen: Infant appears active, with normal color, normal  cry.  Skin: Intact, no lesions. No jaundice.  HEENT: Scalp is normal with open, soft, flat fontanels, normal sutures, no edema or hematoma, eyes light reflex b/l, sclera clear, Ears symmetric, cartilage well formed, no pits or tags, Nares patent b/l, palate intact, lips and tongue normal.  LUNG: Normal spontaneous respirations with no retractions, no nasal flaring, breath sounds bilaterally  HEART: Strong, regular heart beat with no murmur, PMI normal, 2+ b/l femoral pulses. Thorax appears symmetric.  ABDOMEN: soft, normal bowel sounds, no masses palpated,  NEURO: Spine normal with no midline defects, anus patent. Good tone, no lethargy,  MUSCULOSKELETAL: Ext normal x 4, 10 fingers 10 toes b/l. No clavicular crepitus or tenderness.  GENITAL: normal    ASSESSMENT: 35.3 M, DOL 11, CA 36.5, admitted for prematurity, respiratory distress 2/2 RDS, feeding issues, s/p hypoglycemia, breech presentation, s/p r/o sepsis, s/p b/l pneumothoraces continues to improve. Pt on RA and is tolerating.   PLAN:  -Continue to monitor on RA  -Continue to monitor PO feeding  -Continue to monitor weight  -F/U with mom to see if she needs breast pump NAME: MAME BURGESS   MRN: [id number]  GA:  35.2     DOL:  11     CA: 36.5    Health Issues - Problem Dx  -  -Breech presentation  -RDS  -Feeding issues  -Bilateral Tension pneumothoraces with CT placement  -Hyperbilirubinemia    Active Diagnoses: RDS, breech, feeding issues    Resolved Diagnoses: b/l pneumothorax s/p chest tube, r/o sepsis, hypoglycemia    Overnight Events: no acute events  Vital Signs Last 24 Hrs  T(C): 36.4 (2018 10:54), Max: 36.9 (2018 20:00)  T(F): 97.5 (2018 10:54), Max: 98.4 (2018 20:00)  HR: 109 (2018 10:54) (109 - 140)  BP: 75/47 (2018 07:30) (69/34 - 75/47)  BP(mean): 48 (2018 20:00) (48 - 48)  RR: 52 (2018 10:54) (36 - 60)  SpO2: 100% (2018 10:54) (99% - 100%)    Drug Dosing Weight  Height (cm): 49 (2018 07:14)  Weight (kg): 2.55 (2018 07:14)    RESP:  - RA    CVS:  - stable    FEN:  - Weight 2361g -16g  - Po feeding Ksim 19kcal at 50ml q 3 hour- last 4 PO feeds: (23,17,25,17)  - TF 168ml/kg/day   - wdx5, UO 1.36    HEME:  -none at the moment     ID:  - afebrile    GI/:  - stools x4    PHYSICAL EXAM:  Gen: Infant appears active, with normal color, normal  cry.  Skin: Intact, no lesions. No jaundice.  HEENT: Scalp is normal with open, soft, flat fontanels, normal sutures, no edema or hematoma, eyes light reflex b/l, sclera clear, Ears symmetric, cartilage well formed, no pits or tags, Nares patent b/l, palate intact, lips and tongue normal.  LUNG: Normal spontaneous respirations with no retractions, no nasal flaring, breath sounds bilaterally  HEART: Strong, regular heart beat with no murmur, PMI normal, 2+ b/l femoral pulses. Thorax appears symmetric.  ABDOMEN: soft, normal bowel sounds, no masses palpated,  NEURO: Spine normal with no midline defects, anus patent. Good tone, no lethargy,  MUSCULOSKELETAL: Ext normal x 4, 10 fingers 10 toes b/l. No clavicular crepitus or tenderness.  GENITAL: normal    ASSESSMENT: 35.3 M, DOL 11, CA 36.5, admitted for prematurity, respiratory distress 2/2 RDS, feeding issues, s/p hypoglycemia, breech presentation, s/p r/o sepsis, s/p b/l pneumothoraces continues to improve. Pt on RA and is tolerating.     PLAN:  -Continue to monitor on RA  -Continue to monitor PO feeding  -Continue to monitor weight  -F/U with mom to see if she needs breast pump NAME: MAME BURGESS   MRN: [id number]  GA:  35.2     DOL:  11     CA: 36.5    Health Issues - Problem Dx  -  -Breech presentation  -RDS  -Feeding issues  -Bilateral Tension pneumothoraces with CT placement  -Hyperbilirubinemia    Active Diagnoses: RDS, breech, feeding issues    Resolved Diagnoses: b/l pneumothorax s/p chest tube, r/o sepsis, hypoglycemia    Overnight Events: no acute events  Vital Signs Last 24 Hrs  T(C): 36.4 (2018 10:54), Max: 36.9 (2018 20:00)  T(F): 97.5 (2018 10:54), Max: 98.4 (2018 20:00)  HR: 109 (2018 10:54) (109 - 140)  BP: 75/47 (2018 07:30) (69/34 - 75/47)  BP(mean): 48 (2018 20:00) (48 - 48)  RR: 52 (2018 10:54) (36 - 60)  SpO2: 100% (2018 10:54) (99% - 100%)    Drug Dosing Weight  Height (cm): 49 (2018 07:14)  Weight (kg): 2.55 (2018 07:14)    RESP:  - RA    CVS:  - stable    FEN:  - Weight 2361g -16g  - Po feeding Ksim 19kcal at 50ml q 3 hour- last 4 PO feeds: (23,17,25,17)  - TF 168ml/kg/day   - wdx5, UO 1.36    HEME:  -no concerns     ID:  - afebrile    GI/:  - stools x4    Neuro:  -no concerns     PHYSICAL EXAM:  Gen: Infant appears active, with normal color, normal  cry.  Skin: Intact, no lesions. No jaundice.  HEENT: Scalp is normal with open, soft, flat fontanels, normal sutures, no edema or hematoma, eyes light reflex b/l, sclera clear, Ears symmetric, cartilage well formed, no pits or tags, Nares patent b/l, palate intact, lips and tongue normal.  LUNG: Normal spontaneous respirations with no retractions, no nasal flaring, breath sounds bilaterally  HEART: Strong, regular heart beat with no murmur, PMI normal, 2+ b/l femoral pulses. Thorax appears symmetric.  ABDOMEN: soft, normal bowel sounds, no masses palpated,  NEURO: Spine normal with no midline defects, anus patent. Good tone, no lethargy,  MUSCULOSKELETAL: Ext normal x 4, 10 fingers 10 toes b/l. No clavicular crepitus or tenderness.  GENITAL: normal    ASSESSMENT: 35.3 M, DOL 11, CA 36.5, admitted for prematurity, respiratory distress 2/2 RDS, feeding issues, s/p hypoglycemia, breech presentation, s/p r/o sepsis, s/p b/l pneumothoraces continues to improve. Pt on RA and is tolerating.     PLAN:  -Continue to monitor on RA  -Continue to monitor PO feeding  -Continue to monitor weight  -F/U with mom to see if she needs breast pump

## 2019-10-15 NOTE — ED ADULT TRIAGE NOTE - CHIEF COMPLAINT QUOTE
pt. c/o of neck spasm that started yesterday. pt. states pain so bad she feels nauseous, 1 episode of vomiting

## 2019-10-15 NOTE — ED STATDOCS - OBJECTIVE STATEMENT
48 y/o female with a PMHx of manic depression presents to the ED c/o neck and shoulder pain described as spasm since yesterday Pt woke up with stiff neck this morning, took 2 Motrin with no relief. Also took a narcotic which is not prescribed to her, was unsure if it was oxycodone or Oxycontin. Pt states pain was so severe she became nauseous and had 1 episode of vomiting. +smoker. +EtOH use. Denies illicit drug use. PMD- Dr. Nelson.

## 2019-10-15 NOTE — ED STATDOCS - ATTENDING CONTRIBUTION TO CARE
I, Jimmie Aquino MD,  performed the initial face to face bedside interview with this patient regarding history of present illness, review of symptoms and relevant past medical, social and family history.  I completed an independent physical examination.  I was the initial provider who evaluated this patient. I have signed out the follow up of any pending tests (i.e. labs, radiological studies) to the ACP.  I have communicated the patient’s plan of care and disposition with the ACP.  The history, relevant review of systems, past medical and surgical history, medical decision making, and physical examination was documented by the scribe in my presence and I attest to the accuracy of the documentation.

## 2019-10-15 NOTE — ED ADULT NURSE NOTE - OBJECTIVE STATEMENT
Patient presents to ED complaining of neck pain. Patient complaining of neck and muscle pain between shoulder blades. Patient states pain started yesterday and has worsened, increased pain on R side. Patient sattes she took motrin and a "pain pill" given by friend PTA. Patient denies trauma to area, heavy lifting. Patient denies fever, chills, NVD. Patient unable to move neck.

## 2019-11-22 ENCOUNTER — EMERGENCY (EMERGENCY)
Facility: HOSPITAL | Age: 49
LOS: 0 days | Discharge: HOME | End: 2019-11-22
Attending: HOSPITALIST
Payer: SELF-PAY

## 2019-11-22 VITALS
TEMPERATURE: 98 F | DIASTOLIC BLOOD PRESSURE: 107 MMHG | RESPIRATION RATE: 17 BRPM | SYSTOLIC BLOOD PRESSURE: 159 MMHG | OXYGEN SATURATION: 99 % | HEART RATE: 87 BPM

## 2019-11-22 VITALS
SYSTOLIC BLOOD PRESSURE: 160 MMHG | RESPIRATION RATE: 18 BRPM | DIASTOLIC BLOOD PRESSURE: 106 MMHG | OXYGEN SATURATION: 100 % | TEMPERATURE: 98 F | HEART RATE: 69 BPM

## 2019-11-22 DIAGNOSIS — Z79.899 OTHER LONG TERM (CURRENT) DRUG THERAPY: ICD-10-CM

## 2019-11-22 DIAGNOSIS — Z86.19 PERSONAL HISTORY OF OTHER INFECTIOUS AND PARASITIC DISEASES: ICD-10-CM

## 2019-11-22 DIAGNOSIS — F31.9 BIPOLAR DISORDER, UNSPECIFIED: ICD-10-CM

## 2019-11-22 DIAGNOSIS — R10.32 LEFT LOWER QUADRANT PAIN: ICD-10-CM

## 2019-11-22 DIAGNOSIS — K57.92 DIVERTICULITIS OF INTESTINE, PART UNSPECIFIED, WITHOUT PERFORATION OR ABSCESS WITHOUT BLEEDING: ICD-10-CM

## 2019-11-22 LAB
ALBUMIN SERPL ELPH-MCNC: 4.1 G/DL — SIGNIFICANT CHANGE UP (ref 3.3–5)
ALP SERPL-CCNC: 125 U/L — HIGH (ref 40–120)
ALT FLD-CCNC: 62 U/L — SIGNIFICANT CHANGE UP (ref 12–78)
ANION GAP SERPL CALC-SCNC: 7 MMOL/L — SIGNIFICANT CHANGE UP (ref 5–17)
APPEARANCE UR: CLEAR — SIGNIFICANT CHANGE UP
AST SERPL-CCNC: 58 U/L — HIGH (ref 15–37)
BASOPHILS # BLD AUTO: 0.04 K/UL — SIGNIFICANT CHANGE UP (ref 0–0.2)
BASOPHILS NFR BLD AUTO: 0.5 % — SIGNIFICANT CHANGE UP (ref 0–2)
BILIRUB SERPL-MCNC: 0.6 MG/DL — SIGNIFICANT CHANGE UP (ref 0.2–1.2)
BILIRUB UR-MCNC: NEGATIVE — SIGNIFICANT CHANGE UP
BUN SERPL-MCNC: 9 MG/DL — SIGNIFICANT CHANGE UP (ref 7–23)
CALCIUM SERPL-MCNC: 9.4 MG/DL — SIGNIFICANT CHANGE UP (ref 8.5–10.1)
CHLORIDE SERPL-SCNC: 104 MMOL/L — SIGNIFICANT CHANGE UP (ref 96–108)
CO2 SERPL-SCNC: 28 MMOL/L — SIGNIFICANT CHANGE UP (ref 22–31)
COLOR SPEC: YELLOW — SIGNIFICANT CHANGE UP
CREAT SERPL-MCNC: 0.88 MG/DL — SIGNIFICANT CHANGE UP (ref 0.5–1.3)
DIFF PNL FLD: NEGATIVE — SIGNIFICANT CHANGE UP
EOSINOPHIL # BLD AUTO: 0.06 K/UL — SIGNIFICANT CHANGE UP (ref 0–0.5)
EOSINOPHIL NFR BLD AUTO: 0.8 % — SIGNIFICANT CHANGE UP (ref 0–6)
GLUCOSE SERPL-MCNC: 103 MG/DL — HIGH (ref 70–99)
GLUCOSE UR QL: NEGATIVE MG/DL — SIGNIFICANT CHANGE UP
HCT VFR BLD CALC: 43.3 % — SIGNIFICANT CHANGE UP (ref 34.5–45)
HGB BLD-MCNC: 14.6 G/DL — SIGNIFICANT CHANGE UP (ref 11.5–15.5)
IMM GRANULOCYTES NFR BLD AUTO: 0.4 % — SIGNIFICANT CHANGE UP (ref 0–1.5)
KETONES UR-MCNC: ABNORMAL
LEUKOCYTE ESTERASE UR-ACNC: NEGATIVE — SIGNIFICANT CHANGE UP
LIDOCAIN IGE QN: 766 U/L — HIGH (ref 73–393)
LYMPHOCYTES # BLD AUTO: 1.62 K/UL — SIGNIFICANT CHANGE UP (ref 1–3.3)
LYMPHOCYTES # BLD AUTO: 20.6 % — SIGNIFICANT CHANGE UP (ref 13–44)
MCHC RBC-ENTMCNC: 33.6 PG — SIGNIFICANT CHANGE UP (ref 27–34)
MCHC RBC-ENTMCNC: 33.7 GM/DL — SIGNIFICANT CHANGE UP (ref 32–36)
MCV RBC AUTO: 99.5 FL — SIGNIFICANT CHANGE UP (ref 80–100)
MONOCYTES # BLD AUTO: 0.5 K/UL — SIGNIFICANT CHANGE UP (ref 0–0.9)
MONOCYTES NFR BLD AUTO: 6.4 % — SIGNIFICANT CHANGE UP (ref 2–14)
NEUTROPHILS # BLD AUTO: 5.6 K/UL — SIGNIFICANT CHANGE UP (ref 1.8–7.4)
NEUTROPHILS NFR BLD AUTO: 71.3 % — SIGNIFICANT CHANGE UP (ref 43–77)
NITRITE UR-MCNC: NEGATIVE — SIGNIFICANT CHANGE UP
PH UR: 5 — SIGNIFICANT CHANGE UP (ref 5–8)
PLATELET # BLD AUTO: 249 K/UL — SIGNIFICANT CHANGE UP (ref 150–400)
POTASSIUM SERPL-MCNC: 3.8 MMOL/L — SIGNIFICANT CHANGE UP (ref 3.5–5.3)
POTASSIUM SERPL-SCNC: 3.8 MMOL/L — SIGNIFICANT CHANGE UP (ref 3.5–5.3)
PROT SERPL-MCNC: 7.5 GM/DL — SIGNIFICANT CHANGE UP (ref 6–8.3)
PROT UR-MCNC: NEGATIVE MG/DL — SIGNIFICANT CHANGE UP
RBC # BLD: 4.35 M/UL — SIGNIFICANT CHANGE UP (ref 3.8–5.2)
RBC # FLD: 12.6 % — SIGNIFICANT CHANGE UP (ref 10.3–14.5)
SODIUM SERPL-SCNC: 139 MMOL/L — SIGNIFICANT CHANGE UP (ref 135–145)
SP GR SPEC: 1.02 — SIGNIFICANT CHANGE UP (ref 1.01–1.02)
UROBILINOGEN FLD QL: NEGATIVE MG/DL — SIGNIFICANT CHANGE UP
WBC # BLD: 7.85 K/UL — SIGNIFICANT CHANGE UP (ref 3.8–10.5)
WBC # FLD AUTO: 7.85 K/UL — SIGNIFICANT CHANGE UP (ref 3.8–10.5)

## 2019-11-22 PROCEDURE — 99284 EMERGENCY DEPT VISIT MOD MDM: CPT | Mod: 25

## 2019-11-22 PROCEDURE — 96374 THER/PROPH/DIAG INJ IV PUSH: CPT | Mod: 59

## 2019-11-22 PROCEDURE — 36415 COLL VENOUS BLD VENIPUNCTURE: CPT

## 2019-11-22 PROCEDURE — 87086 URINE CULTURE/COLONY COUNT: CPT

## 2019-11-22 PROCEDURE — 80053 COMPREHEN METABOLIC PANEL: CPT

## 2019-11-22 PROCEDURE — 96375 TX/PRO/DX INJ NEW DRUG ADDON: CPT | Mod: 59

## 2019-11-22 PROCEDURE — 85025 COMPLETE CBC W/AUTO DIFF WBC: CPT

## 2019-11-22 PROCEDURE — 86901 BLOOD TYPING SEROLOGIC RH(D): CPT

## 2019-11-22 PROCEDURE — 83690 ASSAY OF LIPASE: CPT

## 2019-11-22 PROCEDURE — 74177 CT ABD & PELVIS W/CONTRAST: CPT | Mod: 26

## 2019-11-22 PROCEDURE — 74177 CT ABD & PELVIS W/CONTRAST: CPT

## 2019-11-22 PROCEDURE — 96376 TX/PRO/DX INJ SAME DRUG ADON: CPT | Mod: 59

## 2019-11-22 PROCEDURE — 96361 HYDRATE IV INFUSION ADD-ON: CPT

## 2019-11-22 PROCEDURE — 99285 EMERGENCY DEPT VISIT HI MDM: CPT

## 2019-11-22 PROCEDURE — 81003 URINALYSIS AUTO W/O SCOPE: CPT

## 2019-11-22 PROCEDURE — 86900 BLOOD TYPING SEROLOGIC ABO: CPT

## 2019-11-22 PROCEDURE — 86850 RBC ANTIBODY SCREEN: CPT

## 2019-11-22 RX ORDER — HYDROMORPHONE HYDROCHLORIDE 2 MG/ML
0.5 INJECTION INTRAMUSCULAR; INTRAVENOUS; SUBCUTANEOUS ONCE
Refills: 0 | Status: DISCONTINUED | OUTPATIENT
Start: 2019-11-22 | End: 2019-11-22

## 2019-11-22 RX ORDER — SODIUM CHLORIDE 9 MG/ML
1000 INJECTION INTRAMUSCULAR; INTRAVENOUS; SUBCUTANEOUS ONCE
Refills: 0 | Status: COMPLETED | OUTPATIENT
Start: 2019-11-22 | End: 2019-11-22

## 2019-11-22 RX ORDER — KETOROLAC TROMETHAMINE 30 MG/ML
30 SYRINGE (ML) INJECTION ONCE
Refills: 0 | Status: DISCONTINUED | OUTPATIENT
Start: 2019-11-22 | End: 2019-11-22

## 2019-11-22 RX ORDER — ONDANSETRON 8 MG/1
4 TABLET, FILM COATED ORAL ONCE
Refills: 0 | Status: COMPLETED | OUTPATIENT
Start: 2019-11-22 | End: 2019-11-22

## 2019-11-22 RX ADMIN — SODIUM CHLORIDE 1000 MILLILITER(S): 9 INJECTION INTRAMUSCULAR; INTRAVENOUS; SUBCUTANEOUS at 18:43

## 2019-11-22 RX ADMIN — HYDROMORPHONE HYDROCHLORIDE 0.5 MILLIGRAM(S): 2 INJECTION INTRAMUSCULAR; INTRAVENOUS; SUBCUTANEOUS at 18:43

## 2019-11-22 RX ADMIN — ONDANSETRON 4 MILLIGRAM(S): 8 TABLET, FILM COATED ORAL at 16:59

## 2019-11-22 RX ADMIN — SODIUM CHLORIDE 1000 MILLILITER(S): 9 INJECTION INTRAMUSCULAR; INTRAVENOUS; SUBCUTANEOUS at 17:00

## 2019-11-22 RX ADMIN — HYDROMORPHONE HYDROCHLORIDE 0.5 MILLIGRAM(S): 2 INJECTION INTRAMUSCULAR; INTRAVENOUS; SUBCUTANEOUS at 16:59

## 2019-11-22 RX ADMIN — Medication 30 MILLIGRAM(S): at 21:15

## 2019-11-22 RX ADMIN — HYDROMORPHONE HYDROCHLORIDE 0.5 MILLIGRAM(S): 2 INJECTION INTRAMUSCULAR; INTRAVENOUS; SUBCUTANEOUS at 17:15

## 2019-11-22 RX ADMIN — HYDROMORPHONE HYDROCHLORIDE 0.5 MILLIGRAM(S): 2 INJECTION INTRAMUSCULAR; INTRAVENOUS; SUBCUTANEOUS at 19:19

## 2019-11-22 RX ADMIN — SODIUM CHLORIDE 1000 MILLILITER(S): 9 INJECTION INTRAMUSCULAR; INTRAVENOUS; SUBCUTANEOUS at 19:18

## 2019-11-22 NOTE — ED STATDOCS - CARE PROVIDER_API CALL
Jesus Stearns (MD)  Gastroenterology; Internal Medicine  [address] [facility] [address], [place] [address]  Phone: [phone]  Fax: [phone]  Follow Up Time:

## 2019-11-22 NOTE — ED STATDOCS - NSFOLLOWUPINSTRUCTIONS_ED_ALL_ED_FT
Acute Diarrhea    WHAT YOU NEED TO KNOW:    Acute diarrhea starts quickly and lasts a short time, usually 1 to 3 days. It can last up to 2 weeks. You may not be able to control your diarrhea. Acute diarrhea usually stops on its own.     DISCHARGE INSTRUCTIONS:    Return to the emergency department if:     You feel confused.       Your heartbeat is faster than usual.       Your eyes look deeply sunken, or you have no tears when you cry.       You urinate less than usual, or your urine is dark yellow.       You have blood or mucus in your bowel movements.      You have severe abdominal pain.       You are unable to drink any liquids.     Contact your healthcare provider if:     Your symptoms do not get better with treatment.       You have a fever higher than 101.3°F (38.5°C).       You have trouble eating and drinking because you are vomiting.       Your diarrhea does not get better in 7 days.       You have questions or concerns about your condition or care.     Follow up with your healthcare provider as directed: Write down your questions so you remember to ask them during your visits.     Medicines:    Diarrhea medicine is an over-the-counter medicine that helps slow or stop your diarrhea. Do not take this medicine unless your healthcare provider says it is okay.       Antibiotics may be given to help treat an infection caused by bacteria.       Antiparasitics may be given to treat an infection caused by parasites.       Take your medicine as directed. Contact your healthcare provider if you think your medicine is not helping or if you have side effects. Tell him of her if you are allergic to any medicine. Keep a list of the medicines, vitamins, and herbs you take. Include the amounts, and when and why you take them. Bring the list or the pill bottles to follow-up visits. Carry your medicine list with you in case of an emergency.    Self-care:     Drink liquids as directed. Liquids will help prevent dehydration caused by diarrhea. Ask your healthcare provider how much liquid to drink each day and which liquids are best for you. You may need to drink an oral rehydration solution (ORS). An ORS has the right amounts of water, salts, and sugar you need to replace body fluids. You can buy an ORS at most grocery stores and pharmacies.       Eat foods that are easy to digest. Examples include rice, lentils, cereal, bananas, potatoes, and bread. It also includes some fruits (bananas, melon), well-cooked vegetables, and lean meats. Do not eat foods high in fiber, fat, and sugar. Do not drink alcohol until your diarrhea is gone.     Prevent acute diarrhea:     Wash your hands often. Use soap and water. Wash your hands before you eat or prepare food. Also wash your hands after you use the bathroom. Use an alcohol-based hand gel when soap and water are not available. Handwashing           Keep bathroom surfaces clean. This helps prevent the spread of germs that cause acute diarrhea.       Wash fruits and vegetables well before you eat them. This can help remove germs that cause diarrhea. If possible, remove the skin from fruits and vegetables, or cook them well before you eat them.       Cook meat and poultry as directed. Meat includes beef and pork. Poultry includes chicken, turkey, and duck.  Cook ground meat to 160°F.       Cook ground poultry, whole poultry, or cuts of poultry to at least 165°F. Remove the poultry from heat. Let it stand for 3 minutes before you eat it.       Cook whole cuts of meat other than poultry to at least 145°F. Remove the meat from heat. Let it stand for 3 minutes before you eat it.       Wash dishes that have touched raw meat or poultry with hot water and soap. This includes cutting boards, utensils, dishes, and serving containers.       Place raw or cooked meat or poultry in the refrigerator as soon as possible. Bacteria can grow in meat or poultry that is left at room temperature too long.       Do not eat raw or undercooked oysters, clams, or mussels. These foods may be contaminated and cause infection.       Drink only filtered or treated water when you travel. Do not put ice in your drinks. Drink bottled water whenever possible.

## 2019-11-22 NOTE — ED STATDOCS - PROGRESS NOTE DETAILS
Patient seen and evaluated upon initial presentation.  REporting diarrhea and LLQ pain.  No acute findings on CT, labs with mildly elevated lipase, no LUQ or epigastric pain.  Patient tolerated PO, although still reporting some LLQ pain and cramping.  Offered to send stool studies but she was not able to provide a sample.  REviewed return precautions and GI follow up -Jaylin Veras PA-C

## 2019-11-22 NOTE — ED STATDOCS - CLINICAL SUMMARY MEDICAL DECISION MAKING FREE TEXT BOX
50 y/o female with PMHx of C. diff, diverticulitis, manic depression, and s/p hysterectomy presents to the ED abd pain r/o diverticular disease and UTI. Will get labs, CT abd/pelvis, and pain control.

## 2019-11-22 NOTE — ED STATDOCS - PATIENT PORTAL LINK FT
You can access the FollowMyHealth Patient Portal offered by [facility] by registering at the following website: [email or web address]. By joining [facility]’s FollowMyHealth portal, you will also be able to view your health information using other applications (apps) compatible with our system.

## 2019-11-22 NOTE — ED STATDOCS - NS_ ATTENDINGSCRIBEDETAILS _ED_A_ED_FT
Victorina Cárdenas MD: The history, relevant review of systems, past medical and surgical history, medical decision making, and physical examination was documented by the scribe in my presence and I attest to the accuracy of the documentation.

## 2019-11-22 NOTE — ED ADULT TRIAGE NOTE - CHIEF COMPLAINT QUOTE
Patient comes in with LLQ abdominal pain. Patient states she had diarrhea x 1 week. Patient states she has hx of c diff. No signs of acute distress noted.

## 2019-11-22 NOTE — ED STATDOCS - OBJECTIVE STATEMENT
48 y/o female with PMHx of C. diff, diverticulitis, manic depression, and s/p hysterectomy presents to the ED c/o intermittent LLQ abd pain. Was hospitalized last year for diverticulitis. Says she is experiencing the same sx. Endorses associated +nausea and +diarrhea. No recent abx. No fever. PCP: Dr. Ben Nelson.

## 2019-11-23 LAB
CULTURE RESULTS: SIGNIFICANT CHANGE UP
SPECIMEN SOURCE: SIGNIFICANT CHANGE UP

## 2021-12-10 ENCOUNTER — TRANSCRIPTION ENCOUNTER (OUTPATIENT)
Age: 51
End: 2021-12-10

## 2022-04-12 NOTE — ED STATDOCS - PROGRESS NOTE DETAILS
Dr. Mukund Webber-  Patient's end of service event monitor report is on your desk for review.     Future Appointments   Date Time Provider Carmella Nikki   7/13/2022 11:00 AM MD DERIK Shaver AMB   7/18/2022 10:15 AM MD SHREYA Gomez BS AMB
Just a few skipped beats, no serious elevated heart rate or afib.  Looks great
Patient informed of results by [employer] message
50 y/o Female with c/o pain to neck at base of skull and radiating down into shoulder / upper back.  Neg trauma.  Reports increased stress levels at work.  Neg numbness/ tingling to UE.  Neg cough, SOB.  On exam, holding head very straight.  Tender, spasmed R trapezius muscle to palp.  Decreased AROM neck.  S/P meds, pt reports pain slowly improving.  Informed her that it will take time to relieve muscle spasm and pain.  Will continue pain meds and Valium as oupt.  Apply Heat and stretcjh.  F/U with Dr. wolf.  Sandra Riojas PA-C

## 2023-09-18 NOTE — CONSULT NOTE ADULT - PROBLEM SELECTOR RECOMMENDATION 9
Continue IV Antibiotics  Keep NPO/IVFs Opioid Counseling: I discussed with the patient the potential side effects of opioids including but not limited to addiction, altered mental status, and depression. I stressed avoiding alcohol, benzodiazepines, muscle relaxants and sleep aids unless specifically okayed by a physician. The patient verbalized understanding of the proper use and possible adverse effects of opioids. All of the patient's questions and concerns were addressed. They were instructed to flush the remaining pills down the toilet if they did not need them for pain.

## 2023-12-05 NOTE — DISCHARGE NOTE ADULT - IF YOU ARE A SMOKER, IT IS IMPORTANT FOR YOUR HEALTH TO STOP SMOKING. PLEASE BE AWARE THAT SECOND HAND SMOKE IS ALSO HARMFUL.
Pollen (tree, grass, weed), dust mites, dog, cat, mold and cockroach were positive.     Dust mites are microscopic insect-like pests that live in house dust. They feed on dead skin or dander that are shed by people and pets.They can worsen asthma and allergies. Dust mites live in mattresses, bedding, upholstered furniture, carpets, and curtains in your home. No matter how clean a home is, dust mites cannot be completely eliminated. A number of things can be done to reduce the number of dust mites:  -Use a dehumidifier or air conditioner to maintain humidity levels at, or below, 50 percent.  -Encase mattress and pillows in dust mite- proof or allergen impermeable covers.  -Wash all bedding and blankets once a week in hot water, 130 to 140 degrees Fahrenheit, to kill dust mites. Non- washable bedding can be frozen overnight.  -Replace wool or feathered bedding products with synthetic materials, and traditional stuffed animals with washable ones.  -In bedrooms, replace wall to wall carpeting with bare floors, and remove fabric curtains and upholstered furniture, whenever possible.  -Use a damp mop or rag to remove dust. Never use a dry cloth, as it stirs up allergens.  -Use a double-layered microfilter bag or a HEPA filter in your vacuum .  -Wear a mask while vacuuming, and stay out of the vacuuming area for 20 minutes after vacuuming, to allow dust and allergens to settle.         Hepa air filter in your bedroom.  Pollen is the highest early morning. Avoid early morning activities, if possible.  Keep windows up and cars clean during pollen season.  Remove all clothing and shower at the end of the day to remove pollen.    Take Allegra 180mg 45 minutes prior to going outside.    
Statement Selected
